# Patient Record
Sex: MALE | Race: OTHER | Employment: OTHER | URBAN - METROPOLITAN AREA
[De-identification: names, ages, dates, MRNs, and addresses within clinical notes are randomized per-mention and may not be internally consistent; named-entity substitution may affect disease eponyms.]

---

## 2017-01-05 ENCOUNTER — HOSPITAL ENCOUNTER (OUTPATIENT)
Dept: LAB | Age: 67
Discharge: HOME | End: 2017-01-05
Payer: MEDICARE

## 2017-01-05 ENCOUNTER — OFFICE VISIT (OUTPATIENT)
Dept: INTERNAL MEDICINE CLINIC | Age: 67
End: 2017-01-05

## 2017-01-05 VITALS
HEART RATE: 81 BPM | TEMPERATURE: 97.8 F | SYSTOLIC BLOOD PRESSURE: 130 MMHG | BODY MASS INDEX: 22.44 KG/M2 | DIASTOLIC BLOOD PRESSURE: 70 MMHG | RESPIRATION RATE: 14 BRPM | WEIGHT: 143 LBS | OXYGEN SATURATION: 98 % | HEIGHT: 67 IN

## 2017-01-05 DIAGNOSIS — R73.01 IFG (IMPAIRED FASTING GLUCOSE): ICD-10-CM

## 2017-01-05 DIAGNOSIS — E78.00 HYPERCHOLESTEROLEMIA: ICD-10-CM

## 2017-01-05 DIAGNOSIS — I10 BENIGN ESSENTIAL HYPERTENSION: Primary | ICD-10-CM

## 2017-01-05 DIAGNOSIS — Z12.5 PROSTATE CANCER SCREENING: ICD-10-CM

## 2017-01-05 DIAGNOSIS — F03.90 DEMENTIA WITHOUT BEHAVIORAL DISTURBANCE, UNSPECIFIED DEMENTIA TYPE: ICD-10-CM

## 2017-01-05 LAB — HBA1C MFR BLD HPLC: 5.6 %

## 2017-01-05 PROCEDURE — 84153 ASSAY OF PSA TOTAL: CPT

## 2017-01-05 PROCEDURE — 83036 HEMOGLOBIN GLYCOSYLATED A1C: CPT

## 2017-01-05 PROCEDURE — 36415 COLL VENOUS BLD VENIPUNCTURE: CPT

## 2017-01-05 PROCEDURE — 80061 LIPID PANEL: CPT

## 2017-01-05 NOTE — PROGRESS NOTES
HPI:  Cristiano Diane is a 78y.o. year old male who returns to clinic today for routine follow up appointment to discuss the issues below:    Here for 6 mo f/u prediabetes, htn and hld. Since last visit got a second opinion in regards to his dementia with Dr. Eryn Correia. She is considering early FTD vs Alzheimer's. He will continue follow up with her again in April. Aricept dose has been increased. He is now fully retired and his wife has taken control of most of the finances. He has been walking regularly for exercise at the park, though there is some dispute about how much he has been walking. His diet isn't quite clear - his wife reports he grazes during the day. He has lost 8 lbs since October and it isn't quite clear why. He reports a good appetite but maybe he isn't fixing robust meals during the day. POC A1c 5.6 % today. He has a possible fam h/o prostate ca and last exam with PSA was in 2012. Prior to Admission medications    Medication Sig Start Date End Date Taking? Authorizing Provider   NORMAinfantis-B.ani-B.long-B.bifi (PROBIOTIC 4X) 10-15 mg TbEC Take  by mouth. Yes Historical Provider   TURMERIC (CURCUMIN) by Does Not Apply route. Yes Historical Provider   VIT C/E/B6/FA/B12/ARGIN/PEP XT (CARDIOTEK, BIOPERINE, PO) Take  by mouth. Yes Historical Provider   magnesium 250 mg tab Take  by mouth daily. Yes Historical Provider   donepezil (ARICEPT) 10 mg tablet Take 1 Tab by mouth daily. 10/13/16  Yes Kaey Salinas MD   atorvastatin (LIPITOR) 20 mg tablet TAKE ONE TABLET BY MOUTH DAILY 7/18/16  Yes Janki Bueno MD   lisinopril (PRINIVIL, ZESTRIL) 10 mg tablet TAKE ONE TABLET BY MOUTH DAILY 7/18/16  Yes Janki Bueno MD   multivitamin (ONE A DAY) tablet Take 1 Tab by mouth daily. Yes Historical Provider   aspirin delayed-release 81 mg tablet Take 81 mg by mouth every three (3) days.    Yes Historical Provider   doxycycline (MONODOX) 100 mg capsule Take 100 mg by mouth every other day. Rosacea   Indications: INHALED ANTHRAX   Yes Historical Provider          No Known Allergies        Review of Systems   Constitutional: Negative for chills, fever and malaise/fatigue. HENT: Negative for congestion. Respiratory: Negative for cough, shortness of breath and wheezing. Cardiovascular: Negative for chest pain, palpitations and leg swelling. Gastrointestinal: Negative for abdominal pain, blood in stool and heartburn. Genitourinary:        He reports: nocturia x 0. He denies: urinary hesitancy, urinary frequency, incomplete voiding, double voiding, weak stream, perineal discomfort. Musculoskeletal: Negative for falls, joint pain and myalgias. Neurological: Negative for dizziness and headaches. Physical Exam   Constitutional: He appears well-nourished. Neck: Carotid bruit is not present. Cardiovascular: Normal rate, regular rhythm and normal heart sounds. No murmur heard. Pulses:       Carotid pulses are 2+ on the right side, and 2+ on the left side. Pulmonary/Chest: Effort normal and breath sounds normal.   Abdominal: Soft. Bowel sounds are normal. There is no hepatosplenomegaly. There is no tenderness. Genitourinary: Rectum normal and prostate normal. Rectal exam shows guaiac negative stool. Prostate is not enlarged and not tender. Genitourinary Comments: Exam chaperoned by Adrian Spencer LPN   Musculoskeletal: He exhibits no edema. Psychiatric: He has a normal mood and affect. His behavior is normal.         Visit Vitals    /70 (BP 1 Location: Left arm, BP Patient Position: Sitting)    Pulse 81    Temp 97.8 °F (36.6 °C) (Oral)    Resp 14    Ht 5' 7\" (1.702 m)    Wt 143 lb (64.9 kg)    SpO2 98%    BMI 22.4 kg/m2         Assessment & Plan:  Jules Goldstein was seen today for hypertension and cholesterol problem.     Diagnoses and all orders for this visit:    Benign essential hypertension  I evaluated and recommended to continue current doses of medications.      Hypercholesterolemia  I evaluated and recommended to continue current doses of medications.      IFG (impaired fasting glucose)  A1c has normalized in the setting of weight loss  -     AMB POC HEMOGLOBIN A1C    Prostate cancer screening  I discussed prostate cancer screening at today's visit including the current dilemma and debate regarding screening and more specifically PSA testing. The pros and cons of proceeding with screening were discussed at length including the risks of unnecessary testing as well as the potential to not detect prostate cancer that may evolve to metastatic disease if decision to not screen is made. The patient concluded to proceed with screening, PSA ordered and DAVE performed  -     PSA SCREENING (SCREENING)    Dementia without behavioral disturbance, unspecified dementia type  Medication management and follow up with Dr. Johny Oscar. I note Fatemeh Moraes continues to lack some insight into his disease. He will need a Pneumovax in May. Follow-up Disposition:  Return in about 6 months (around 7/5/2017) for HTN follow up. Advised him to call back or return to office if symptoms worsen/change/persist.  Discussed expected course/resolution/complications of diagnosis in detail with patient. Medication risks/benefits/costs/interactions/alternatives discussed with patient. He was given an after visit summary which includes diagnoses, current medications, & vitals. He expressed understanding with the diagnosis and plan.

## 2017-01-05 NOTE — PROGRESS NOTES
Reviewed record in preparation for visit and have obtained necessary documentation. Identified pt with two pt identifiers(name and ). Health Maintenance Due   Topic    ZOSTER VACCINE AGE 60>     MEDICARE YEARLY EXAM     FOBT Q 1 YEAR AGE 54-65     INFLUENZA AGE 9 TO ADULT     GLAUCOMA SCREENING Q2Y          Chief Complaint   Patient presents with    Hypertension    Cholesterol Problem        Wt Readings from Last 3 Encounters:   17 143 lb (64.9 kg)   10/13/16 151 lb (68.5 kg)   16 151 lb (68.5 kg)     Temp Readings from Last 3 Encounters:   17 97.8 °F (36.6 °C) (Oral)   16 96.4 °F (35.8 °C) (Oral)   05/04/15 96.7 °F (35.9 °C) (Oral)     BP Readings from Last 3 Encounters:   17 130/70   10/13/16 128/58   16 132/74     Pulse Readings from Last 3 Encounters:   17 81   10/13/16 70   16 (!) 57           Learning Assessment:  :     Learning Assessment 2014   PRIMARY LEARNER Patient   HIGHEST LEVEL OF EDUCATION - PRIMARY LEARNER  > 4 YEARS OF COLLEGE   BARRIERS PRIMARY LEARNER NONE   CO-LEARNER CAREGIVER No   PRIMARY LANGUAGE ENGLISH   LEARNER PREFERENCE PRIMARY OTHER (COMMENT)   ANSWERED BY patient   RELATIONSHIP SELF       Depression Screening:  :     PHQ 2 / 9, over the last two weeks 2016   Little interest or pleasure in doing things Not at all   Feeling down, depressed or hopeless Not at all   Total Score PHQ 2 0       Fall Risk Assessment:  :     Fall Risk Assessment, last 12 mths 2016   Able to walk? Yes   Fall in past 12 months? No       Abuse Screening:  :     Abuse Screening Questionnaire 2014   Do you ever feel afraid of your partner? N   Are you in a relationship with someone who physically or mentally threatens you? N   Is it safe for you to go home?  Y       Coordination of Care Questionnaire:  :     1) Have you been to an emergency room, urgent care clinic since your last visit? no   Hospitalized since your last visit? no 2) Have you seen or consulted any other health care providers outside of [address] [place] [address] since your last visit? no  (Include any pap smears or colon screenings in this section.)    3) Do you have an Advance Directive on file? no    4) Are you interested in receiving information on Advance Directives?  NO

## 2017-01-05 NOTE — MR AVS SNAPSHOT
Visit Information Date & Time Provider Department Dept. Phone Encounter #  
 1/5/2017  2:40 PM MD Keisha Blakely [facility] [phone] Follow-up Instructions Return in about 6 months (around 7/5/2017) for HTN follow up. Your Appointments 4/13/2017  2:40 PM  
Follow Up with Haley Massey MD  
[facility] at [facility]) Appt Note: follow up memory loss cl  
 [address] [facility] [address] [facility] [address] [address]  
[phone]  
  
   
 [address] [address] Upcoming Health Maintenance Date Due ZOSTER VACCINE AGE 60> 1/11/2009 MEDICARE YEARLY EXAM 1/11/2014 FOBT Q 1 YEAR AGE 50-75 11/4/2015 GLAUCOMA SCREENING Q2Y 10/15/2016 DTaP/Tdap/Td series (2 - Td) 1/1/2021 Allergies as of 1/5/2017  Review Complete On: 1/5/2017 By: Jose Cruz Mckeon LPN No Known Allergies Current Immunizations  Reviewed on 1/5/2017 Name Date Influenza Vaccine 10/1/2016, 10/20/2014, 11/5/2013 Pneumococcal Conjugate (PCV-13) 5/20/2016 Pneumococcal Vaccine (Unspecified Type) 10/20/2014 Tdap 1/1/2011 Zoster Vaccine, Live 1/1/2000 Reviewed by [id number], MD on 1/5/2017 at  3:32 PM  
 Reviewed by [id number], MD on 1/5/2017 at  3:32 PM  
 Reviewed by [id number], MD on 1/5/2017 at  3:36 PM  
You Were Diagnosed With   
  
 Codes Comments Benign essential hypertension    -  Primary ICD-10-CM: I10 
ICD-9-CM: 401.1 Hypercholesterolemia     ICD-10-CM: E78.00 ICD-9-CM: 272.0 IFG (impaired fasting glucose)     ICD-10-CM: R73.01 
ICD-9-CM: 790.21 Prostate cancer screening     ICD-10-CM: Z12.5 ICD-9-CM: V76.44 Vitals BP Pulse Temp Resp Height(growth percentile) Weight(growth percentile) 130/70 (BP 1 Location: Left arm, BP Patient Position: Sitting) 81 97.8 °F (36.6 °C) (Oral) 14 5' 7\" (1.702 m) 143 lb (64.9 kg) SpO2 BMI Smoking Status 98% 22.4 kg/m2 Never Smoker BMI and BSA Data Body Mass Index Body Surface Area  
 22.4 kg/m 2 1.75 m 2 Preferred Pharmacy Pharmacy Name Phone Sylvester Riggins [facility], [address] [place] [address] [phone] Your Updated Medication List  
  
   
This list is accurate as of: 1/5/17  3:47 PM.  Always use your most recent med list.  
  
  
  
  
 aspirin delayed-release 81 mg tablet Take 81 mg by mouth every three (3) days. atorvastatin 20 mg tablet Commonly known as:  LIPITOR  
TAKE ONE TABLET BY MOUTH DAILY CARDIOTEK (BIOPERINE) PO Take  by mouth. CURCUMIN  
by Does Not Apply route. donepezil 10 mg tablet Commonly known as:  ARICEPT Take 1 Tab by mouth daily. doxycycline 100 mg capsule Commonly known as:  [place] Take 100 mg by mouth every other day. Rosacea   Indications: INHALED ANTHRAX  
  
 lisinopril 10 mg tablet Commonly known as:  PRINIVIL, ZESTRIL  
TAKE ONE TABLET BY MOUTH DAILY  
  
 magnesium 250 mg Tab Take  by mouth daily. multivitamin tablet Commonly known as:  ONE A DAY Take 1 Tab by mouth daily. PROBIOTIC 4X 10-15 mg Tbec Generic drug:  B.infantis-B.ani-B.long-B.bifi Take  by mouth. We Performed the Following AMB POC HEMOGLOBIN A1C [33083 CPT(R)] PSA SCREENING (SCREENING) [ [facility]] Follow-up Instructions Return in about 6 months (around 7/5/2017) for HTN follow up. Patient Instructions PRESCRIPTION REFILL POLICY Effective 4/2/2014 In an effort to ensure that the large volume of prescription refills are processed in the most efficient and expeditious manner, we are asking our patients to assist us by calling your pharmacy for all prescription refills. This will include Mail Order Pharmacies. The pharmacy will contact our office electronically to continue the refill process. Please do not wait until the last minute to call your pharmacy. We require 72 hours (3 days) to fill prescriptions. We also encourage you to call your pharmacy before picking up your prescription to make sure it is ready. With regard to controlled substance refill request (narcotics and many ADD/ADHD treatment prescriptions) and any other prescriptions that need to be picked up at our office, we ask your cooperation by providing us with at least 72 hours (3 days) notice prior to your need of the prescription. You will need to show a valid ID when picking up your prescription. Anyone delegated by you to  your prescriptions must be listed be listed on you HIPPA authorization form, and show a valid ID. Narcotics will not be refilled on a weekend or on a holiday in which the office is closed. We also encourage an alternative method of refill requests, which is through our medically secure web portal, Tailwind. This is an efficient and effective way to communicate your requests directly with the office and provider. Tailwind can be downloaded onto your smartphone as an Corrie. If you are ready to be connected, please review the attached instructions or speak to any of our staff to get you set up right away! Thank you so much for your cooperation. Should you have any questions, please contact our Practice Administrator, Tomas Rodriguez at [phone] Prostate Cancer Screening: Care Instructions Your Care Instructions The prostate gland is an organ found just below a man's bladder. It is the size and shape of a walnut. It surrounds the tube that carries urine from the bladder out of the body through the penis. This tube is called the urethra. Prostate cancer is the abnormal growth of cells in the prostate.  It is the second most common type of cancer in men. (Skin cancer is the most common.) Most cases of prostate cancer occur in men older than 72. The disease runs in families. And it's more common in -American men. When it's found and treated early, prostate cancer may be cured. But it is not always treated. This is because prostate cancer may not shorten your life, especially if you are older and the cancer is growing slowly. Follow-up care is a key part of your treatment and safety. Be sure to make and go to all appointments, and call your doctor if you are having problems. It's also a good idea to know your test results and keep a list of the medicines you take. What are the screening tests for prostate cancer? The main screening test for prostate cancer is the prostate-specific antigen (PSA) test. This is a blood test that measures how much PSA is in your blood. A high level may mean that you have an enlargement, an infection, or cancer. Along with the PSA test, you may have a digital rectal exam. The digital (finger) rectal exam checks for anything abnormal in your prostate. To do the exam, the doctor puts a lubricated, gloved finger into your rectum. If these tests suggest cancer, you may need a prostate biopsy. How is prostate cancer diagnosed? In a biopsy, the doctor takes small tissue samples from your prostate gland. Another doctor then looks at the tissue under a microscope to see if there are cancer cells, signs of infection, or other problems. The results help diagnose prostate cancer. What are the pros and cons of screening? Neither a PSA test nor a digital rectal exam can tell you for sure that you do or do not have cancer. But they can help you decide if you need more tests, such as a prostate biopsy. Screening tests may be useful because most men with prostate cancer don't have symptoms. It can be hard to know if you have cancer until it is more advanced. And then it's harder to treat. But having a PSA test can also cause harm. The test may show high levels of PSA that aren't caused by cancer. So you could have a prostate biopsy you didn't need. Or the PSA test might be normal when there is cancer, so a cancer might not be found early. The test can also find cancers that would never have caused a problem during your lifetime. So you might have treatment that was not needed. Prostate cancer usually develops late in life and grows slowly. For many men, it does not shorten their lives. Some experts advise screening only for men who are at high risk. Talk with your doctor to see if screening is right for you. Where can you learn more? Go to http://ti-danya.info/. Enter R550 in the search box to learn more about \"Prostate Cancer Screening: Care Instructions. \" Current as of: July 26, 2016 Content Version: 11.1 © 8642-7323 [employer]. Care instructions adapted under license by [employer] (which disclaims liability or warranty for this information). If you have questions about a medical condition or this instruction, always ask your healthcare professional. [address] any warranty or liability for your use of this information. Introducing [facility] & HEALTH SERVICES! Dear Catherine Dumont: Thank you for requesting a Limeade account. Our records indicate that you have previously registered for a Limeade account but its currently inactive. Please call our Limeade support line at [phone]. Additional Information If you have questions, please visit the Frequently Asked Questions section of the Limeade website at [email or web address] Remember, Limeade is NOT to be used for urgent needs. For medical emergencies, dial 911. Now available from your iPhone and Android! Please provide this summary of care documentation to your next provider. Your primary care clinician is listed as Dolores Flanagan. If you have any questions after today's visit, please call [phone].

## 2017-01-05 NOTE — PATIENT INSTRUCTIONS
PRESCRIPTION REFILL POLICY  Effective 0/1/6763    In an effort to ensure that the large volume of prescription refills are processed in the most efficient and expeditious manner, we are asking our patients to assist us by calling your pharmacy for all prescription refills. This will include Mail Order Pharmacies. The pharmacy will contact our office electronically to continue the refill process. Please do not wait until the last minute to call your pharmacy. We require 72 hours (3 days) to fill prescriptions. We also encourage you to call your pharmacy before picking up your prescription to make sure it is ready. With regard to controlled substance refill request (narcotics and many ADD/ADHD treatment prescriptions) and any other prescriptions that need to be picked up at our office, we ask your cooperation by providing us with at least 72 hours (3 days) notice prior to your need of the prescription. You will need to show a valid ID when picking up your prescription. Anyone delegated by you to  your prescriptions must be listed be listed on you HIPPA authorization form, and show a valid ID. Narcotics will not be refilled on a weekend or on a holiday in which the office is closed. We also encourage an alternative method of refill requests, which is through our medically secure web portal, Paperlinks. This is an efficient and effective way to communicate your requests directly with the office and provider. Paperlinks can be downloaded onto your smartphone as an Corrie. If you are ready to be connected, please review the attached instructions or speak to any of our staff to get you set up right away! Thank you so much for your cooperation. Should you have any questions, please contact our Practice Administrator, Winnie Poole at [phone]         Prostate Cancer Screening: Care Instructions  Your Care Instructions    The prostate gland is an organ found just below a man's bladder.  It is the size and shape of a walnut. It surrounds the tube that carries urine from the bladder out of the body through the penis. This tube is called the urethra. Prostate cancer is the abnormal growth of cells in the prostate. It is the second most common type of cancer in men. (Skin cancer is the most common.)  Most cases of prostate cancer occur in men older than 72. The disease runs in families. And it's more common in -American men. When it's found and treated early, prostate cancer may be cured. But it is not always treated. This is because prostate cancer may not shorten your life, especially if you are older and the cancer is growing slowly. Follow-up care is a key part of your treatment and safety. Be sure to make and go to all appointments, and call your doctor if you are having problems. It's also a good idea to know your test results and keep a list of the medicines you take. What are the screening tests for prostate cancer? The main screening test for prostate cancer is the prostate-specific antigen (PSA) test. This is a blood test that measures how much PSA is in your blood. A high level may mean that you have an enlargement, an infection, or cancer. Along with the PSA test, you may have a digital rectal exam. The digital (finger) rectal exam checks for anything abnormal in your prostate. To do the exam, the doctor puts a lubricated, gloved finger into your rectum. If these tests suggest cancer, you may need a prostate biopsy. How is prostate cancer diagnosed? In a biopsy, the doctor takes small tissue samples from your prostate gland. Another doctor then looks at the tissue under a microscope to see if there are cancer cells, signs of infection, or other problems. The results help diagnose prostate cancer. What are the pros and cons of screening? Neither a PSA test nor a digital rectal exam can tell you for sure that you do or do not have cancer.  But they can help you decide if you need more tests, such as a prostate biopsy. Screening tests may be useful because most men with prostate cancer don't have symptoms. It can be hard to know if you have cancer until it is more advanced. And then it's harder to treat. But having a PSA test can also cause harm. The test may show high levels of PSA that aren't caused by cancer. So you could have a prostate biopsy you didn't need. Or the PSA test might be normal when there is cancer, so a cancer might not be found early. The test can also find cancers that would never have caused a problem during your lifetime. So you might have treatment that was not needed. Prostate cancer usually develops late in life and grows slowly. For many men, it does not shorten their lives. Some experts advise screening only for men who are at high risk. Talk with your doctor to see if screening is right for you. Where can you learn more? Go to http://ti-danya.info/. Enter R550 in the search box to learn more about \"Prostate Cancer Screening: Care Instructions. \"  Current as of: July 26, 2016  Content Version: 11.1  © 5469-1829 [employer]. Care instructions adapted under license by [employer] (which disclaims liability or warranty for this information). If you have questions about a medical condition or this instruction, always ask your healthcare professional. [address] any warranty or liability for your use of this information.

## 2017-01-06 LAB — PSA SERPL-MCNC: 1.8 NG/ML (ref 0–4)

## 2017-04-07 NOTE — TELEPHONE ENCOUNTER
Requested Prescriptions     Pending Prescriptions Disp Refills    atorvastatin (LIPITOR) 20 mg tablet 90 Tab 2    lisinopril (PRINIVIL, ZESTRIL) 10 mg tablet 90 Tab 2     Last visit 01/05/2017  Please send to the [facility] pharmacy on file

## 2017-04-10 RX ORDER — ATORVASTATIN CALCIUM 20 MG/1
20 TABLET, FILM COATED ORAL DAILY
Qty: 90 TAB | Refills: 1 | Status: SHIPPED | OUTPATIENT
Start: 2017-04-10 | End: 2017-10-12

## 2017-04-10 RX ORDER — LISINOPRIL 10 MG/1
10 TABLET ORAL DAILY
Qty: 90 TAB | Refills: 1 | Status: SHIPPED | OUTPATIENT
Start: 2017-04-10 | End: 2017-09-30 | Stop reason: SDUPTHER

## 2017-04-13 ENCOUNTER — OFFICE VISIT (OUTPATIENT)
Dept: NEUROLOGY | Age: 67
End: 2017-04-13

## 2017-04-13 VITALS
HEART RATE: 51 BPM | WEIGHT: 149 LBS | DIASTOLIC BLOOD PRESSURE: 68 MMHG | HEIGHT: 67 IN | OXYGEN SATURATION: 98 % | SYSTOLIC BLOOD PRESSURE: 130 MMHG | BODY MASS INDEX: 23.39 KG/M2 | RESPIRATION RATE: 18 BRPM

## 2017-04-13 DIAGNOSIS — F03.90 DEMENTIA WITHOUT BEHAVIORAL DISTURBANCE, UNSPECIFIED DEMENTIA TYPE: Primary | ICD-10-CM

## 2017-04-13 RX ORDER — LANOLIN ALCOHOL/MO/W.PET/CERES
1000 CREAM (GRAM) TOPICAL DAILY
COMMUNITY

## 2017-04-13 NOTE — PATIENT INSTRUCTIONS
Thank you for choosing Alveta Mix and [facility] for your     care. You may receive a survey about your visit. We appreciate you taking time     to complete this survey as we use your feedback to improve our services. We     realize we are not perfect, but we strive to provide excellent care. [facility]   Statement to Patients  April 1, 2014      In an effort to ensure the large volume of patient prescription refills is processed in the most efficient and expeditious manner, we are asking our patients to assist us by calling your Pharmacy for all prescription refills, this will include also your  Mail Order Pharmacy. The pharmacy will contact our office electronically to continue the refill process. Please do not wait until the last minute to call your pharmacy. We need at least 48 hours (2days) to fill prescriptions. We also encourage you to call your pharmacy before going to  your prescription to make sure it is ready. With regard to controlled substance prescription refill requests (narcotic refills) that need to be picked up at our office, we ask your cooperation by providing us with at least 72 hours (3days) notice that you will need a refill. We will not refill narcotic prescription refill requests after 4:00pm on any weekday, Monday through Thursday, or after 2:00pm on Fridays, or on the weekends. We encourage everyone to explore another way of getting your prescription refill request processed using [employer], our patient web portal through our electronic medical record system. [employer] is an efficient and effective way to communicate your medication request directly to the office and  downloadable as an hood on your smart phone . [employer] also features a review functionality that allows you to view your medication list as well as leave messages for your physician. Are you ready to get connected?  If so please review the attatched instructions or speak to any of our staff to get you set up right away! Thank you so much for your cooperation. Should you have any questions please contact our Practice Administrator.     The Physicians and Staff,  [facility]

## 2017-04-13 NOTE — PROGRESS NOTES
Patient here for follow up on memory loss. He stated he feels things have been the same. No falls since last office visit.

## 2017-04-13 NOTE — PROGRESS NOTES
Neurology Consult Note      HISTORY PROVIDED BY: patient    Chief Complaint:   Chief Complaint   Patient presents with    Memory Loss      Subjective:   Pt is a 75 y.o. left handed male initially and last seen in clinic on 10/13/16 in consultation for progressive memory loss, first noticed around 2013, becoming very noticeable in 2015 with Neuropsychological testing by Dr. Mathieu Valadez in March, 2015 with data supporting a memory issue as well as for other cognitive issues including fluency and naming. Insight, judgement and problem solving concerns. Dr. Mathieu Valadez felt that he might have an evolving FTD without behavioral component at that time. MRI brain w/wo contrast 6/19/15 with left temporal atrophy with asymmetric lateral ventricle on the left. His wife denied any significant change in personality, no compulsive behaviors, no disinhibition, no apathy. Exam was non-focal, MMSE score was 24/30 missing two orientation, 3/3 at delayed recall, 1 naming, but had great difficulty with naming, describing items prior to coming up with the name, had great difficulty with attention requiring multiple attempts to spell WORLD backward. History and exam c/w dementia, possibly early FTD vs Alzheimers. Pt did well socially during the history, though was defensive and argumentative at times, without insight into situation, but had clear impairment during the MMSE and his score does not accurately reflect the degree of difficulty he had during the test. Discussed diagnoses, treatment options, and goals of treatment. Recommended increasing Aricept to full dose of 10mg every day. He returns for f/u accompanied by his wife. He feels like he is stable. He is still driving and has not had any problems. Still managing his ADLs without difficulties. His wife is in agreement. She notes that he will often worry about issues that are not issues yet. Still taking Aricept 10mg daily without side effects.      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 Family History   Problem Relation Age of Onset   [facility] Arthritis-osteo Mother     Heart Disease Father 58     acute MI    Heart Disease Paternal Grandfather 62         Objective:   Review of Systems : Per HPI, o/w neg      No Known Allergies     Meds:  Outpatient Medications Prior to Visit   Medication Sig Dispense Refill    atorvastatin (LIPITOR) 20 mg tablet Take 1 Tab by mouth daily. 90 Tab 1    lisinopril (PRINIVIL, ZESTRIL) 10 mg tablet Take 1 Tab by mouth daily. 90 Tab 1    B.infantis-B.ani-B.long-B.bifi (PROBIOTIC 4X) 10-15 mg TbEC Take  by mouth. Indications: takes 1 cap twice a week      TURMERIC (CURCUMIN) by Does Not Apply route.  VIT C/E/B6/FA/B12/ARGIN/PEP XT (CARDIOTEK, BIOPERINE, PO) Take  by mouth.  magnesium 250 mg tab Take  by mouth daily. Indications: takes 3 times a week      donepezil (ARICEPT) 10 mg tablet Take 1 Tab by mouth daily. 90 Tab 3    multivitamin (ONE A DAY) tablet Take 1 Tab by mouth daily.  aspirin delayed-release 81 mg tablet Take 81 mg by mouth every three (3) days.       doxycycline (MONODOX) 100 mg capsule Take 100 mg by mouth every other day. Rosacea   Indications: INHALED ANTHRAX       No facility-administered medications prior to visit. Imaging:  MRI Results (most recent):    Results from Abstract encounter on 11/02/16   MRI BRAIN W WO CONT   CT Results (most recent):  No results found for this or any previous visit. Reviewed records in [employer] tab today    Lab Review   Results for orders placed or performed in visit on 01/05/17   PSA SCREENING (SCREENING)   Result Value Ref Range    Prostate Specific Ag 1.8 0.0 - 4.0 ng/mL   AMB POC HEMOGLOBIN A1C   Result Value Ref Range    Hemoglobin A1c (POC) 5.6 %        Exam:  Visit Vitals    /68    Pulse (!) 51    Resp 18    Ht 5' 7\" (1.702 m)    Wt 67.6 kg (149 lb)    SpO2 98%    BMI 23.34 kg/m2     General:  Alert, cooperative, no distress. Head:  Normocephalic, without obvious abnormality, atraumatic. Respiratory:  Heart:   Non-labored breathing   Neck:      Extremities:    Pulses:        Neurologic:  MS: Alert, speech intact. Language- See MMSE. Attention and fund of knowledge appropriate.   Cranial Nerves:  II: visual fields    II: pupils    II: optic disc    III,VII: ptosis none   III,IV,VI: extraocular muscles  EOMI, no nystagmus    V: facial light touch sensation     VII: facial muscle function   symmetric   VIII: hearing intact   IX: soft palate elevation     XI: trapezius strength     XI: sternocleidomastoid strength    XII: tongue       Motor:   Sensory:   Coordination:   Gait:   Reflexes:     Mini Mental State Exam 4/13/2017 10/13/2016   What is the Year 1 1   What is the Season 1 0   What is the Date 0 0   What is the Day 1 1   What is the Month 1 1   Where are we State 1 1   Where are we Country 1 1   Where are we [address] [place] [address] or [facility] 1 1   Whree are we Floor 1 1   Name three objects, then ask the patient to say them 3 3   Serial sevens Subtract 7 from 100 in increments 4 5   Ask for the three objects repeated above 0 0   Name a pencil 1 1   Name a watch 0 0 Have the patient repeat this phrase \"No ifs, ands, or buts\" 1 1   Three stage command: Take the paper in your right hand 1 1   Fold the paper in half 1 1   Put the paper on the floor 1 1   Read and obey the following: CLOSE YOUR EYES 1 1   Have the patient write a sentence 0 1   Have the patient copy a figure 1 1   Mini Mental Score 23 24          Assessment/Plan   Pt is a 75 y.o. left handed male with dementia, possible FTD vs AD, with progressive memory loss first noticed around 2013, becoming very noticeable in 2015 with Neuropsychological testing by Dr. Annabelle Rodriguez in March, 2015 with data supporting a memory issue as well as for other cognitive issues including fluency and naming. 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 MRI brain w/wo contrast 6/19/15 with left temporal atrophy with asymmetric lateral ventricle on the left. His wife denied any significant change in personality, no compulsive behaviors, no disinhibition, no apathy, but does appear to have increased anxiety at this time. Exam was non-focal, MMSE score at 10/2016 visit 24/30, today 23/30 missing 1 orientation, 1 attn, 3/3 at delayed recall, unable to name 1/2 items describing item in extraordinary detail, unable to write a sentence. dementia, possibly early FTD vs Alzheimers. Continued noted defensiveness and argumentative at times, without insight into situation. Start Namzaric 7mg-10mg and titrate to 28mg-10mg, given titration kit and Rx for full dose. Instructed to stop Aricept. Suggested they call me in 2 months, if doing well on Namzaric at that time, will start Effexor XR 37.5 for mood, appears to have a great deal of anxiety. F/u in clinic in six months, instructed to call in the interim if needed. ICD-10-CM ICD-9-CM    1. Dementia without behavioral disturbance, unspecified dementia type F03.90 294.20        Signed:   Qian Larsen MD  4/13/2017

## 2017-04-13 NOTE — MR AVS SNAPSHOT
Visit Information Date & Time Provider Department Dept. Phone Encounter #  
 4/13/2017  2:40 PM Alejandro Adames MD [facility] at [facility] [id number] Follow-up Instructions Return in about 6 months (around 10/13/2017). Routing History Your Appointments 7/11/2017  2:20 PM  
ROUTINE CARE with MD Keisha Bojorquez [facility] ([facility]) Appt Note: 6 mo f/u for HTN  
 [address], [address]  
[address], [phone] Upcoming Health Maintenance Date Due  
 MEDICARE YEARLY EXAM 1/11/2014 FOBT Q 1 YEAR AGE 50-75 11/4/2015 GLAUCOMA SCREENING Q2Y 10/15/2016 Pneumococcal 65+ Low/Medium Risk (2 of 2 - PPSV23) 5/20/2017 DTaP/Tdap/Td series (2 - Td) 1/1/2021 Allergies as of 4/13/2017  Review Complete On: 4/13/2017 By: Oly Lira LPN No Known Allergies Current Immunizations  Reviewed on 1/5/2017 Name Date Influenza Vaccine 10/1/2016, 10/20/2014, 11/5/2013 Pneumococcal Conjugate (PCV-13) 5/20/2016 Tdap 1/1/2011 Zoster Vaccine, Live 1/1/2000 Not reviewed this visit Vitals BP Pulse Resp Height(growth percentile) Weight(growth percentile) SpO2  
 130/68 (!) 51 18 5' 7\" (1.702 m) 149 lb (67.6 kg) 98% BMI Smoking Status 23.34 kg/m2 Never Smoker Vitals History BMI and BSA Data Body Mass Index Body Surface Area  
 23.34 kg/m 2 1.79 m 2 Preferred Pharmacy Pharmacy Name Phone [facility] [phone] [facility] [phone] Your Updated Medication List  
  
   
This list is accurate as of: 4/13/17  3:21 PM.  Always use your most recent med list.  
  
  
  
  
 aspirin delayed-release 81 mg tablet Take 81 mg by mouth every three (3) days. atorvastatin 20 mg tablet Commonly known as:  LIPITOR Take 1 Tab by mouth daily. CARDIOTEK (BIOPERINE) PO Take  by mouth. COCONUT OIL PO Take  by mouth. CURCUMIN  
by Does Not Apply route. doxycycline 100 mg capsule Commonly known as:  Carlos Baker Take 100 mg by mouth every other day. Rosacea   Indications: INHALED ANTHRAX  
  
 lisinopril 10 mg tablet Commonly known as:  Sundarne Robel Take 1 Tab by mouth daily. magnesium 250 mg Tab Take  by mouth daily. Indications: takes 3 times a week  
  
 memantine-donepezil 28-10 mg Cspx Commonly known as:  MOT[employer] PI[employer]URE [facility] Take 1 Cap by mouth daily. multivitamin tablet Commonly known as:  ONE A DAY Take 1 Tab by mouth daily. PROBIOTIC 4X 10-15 mg Tbec Generic drug:  B.infantis-B.ani-B.long-B.bifi Take  by mouth. Indications: takes 1 cap twice a week VITAMIN B-12 1,000 mcg tablet Generic drug:  cyanocobalamin Take 1,000 mcg by mouth daily. Indications: takes 3 days a week Prescriptions Printed Refills  
 memantine-donepezil (NAMZARIC) 28-10 mg CSpX 3 Sig: Take 1 Cap by mouth daily. Class: Print Route: Oral  
  
Follow-up Instructions Return in about 6 months (around 10/13/2017). Patient Instructions Thank you for choosing [facility] for your  
 
care. You may receive a survey about your visit. We appreciate you taking time  
 
to complete this survey as we use your feedback to improve our services. We  
 
realize we are not perfect, but we strive to provide excellent care. PRESCRIPTION REFILL POLICY [place] [employer] [facility] Statement to Patients April 1, 2014 In an effort to ensure the large volume of patient prescription refills is processed in the most efficient and expeditious manner, we are asking our patients to assist us by calling your Pharmacy for all prescription refills, this will include also your  Mail Order Pharmacy.  The pharmacy will contact our office electronically to continue the refill process. Please do not wait until the last minute to call your pharmacy. We need at least 48 hours (2days) to fill prescriptions. We also encourage you to call your pharmacy before going to  your prescription to make sure it is ready. With regard to controlled substance prescription refill requests (narcotic refills) that need to be picked up at our office, we ask your cooperation by providing us with at least 72 hours (3days) notice that you will need a refill. We will not refill narcotic prescription refill requests after 4:00pm on any weekday, Monday through Thursday, or after 2:00pm on Fridays, or on the weekends. We encourage everyone to explore another way of getting your prescription refill request processed using [employer], our patient web portal through our electronic medical record system. [employer] is an efficient and effective way to communicate your medication request directly to the office and  downloadable as an hood on your smart phone . [employer] also features a review functionality that allows you to view your medication list as well as leave messages for your physician. Are you ready to get connected? If so please review the attatched instructions or speak to any of our staff to get you set up right away! Thank you so much for your cooperation. Should you have any questions please contact our Practice Administrator. The Physicians and Staff,  [facility] SERVICES! Dear Johnathon Al: Thank you for requesting a [employer] account. Our records indicate that you have previously registered for a [employer] account but its currently inactive. Please call our [employer] support line at [phone]. Additional Information If you have questions, please visit the Frequently Asked Questions section of the [employer] website at [email or web address] Remember, MyChart is NOT to be used for urgent needs. For medical emergencies, dial 911. Now available from your iPhone and Android! Please provide this summary of care documentation to your next provider. Your primary care clinician is listed as [address]. If you have any questions after today's visit, please call [phone].

## 2017-05-09 ENCOUNTER — TELEPHONE (OUTPATIENT)
Dept: NEUROLOGY | Age: 67
End: 2017-05-09

## 2017-05-09 NOTE — TELEPHONE ENCOUNTER
----- Message from Harish Barros sent at 5/9/2017  8:35 AM EDT -----  Regarding: FW: Ge/telephone      ----- Message -----     From: Mireya Chirinos     Sent: 5/8/2017   2:57 PM       To: [facility] Office  Subject: [place]/telephone                                 Pts wife Delorise Letters stated the medication Namzaric is to costly and she is requesting to know if there is another medication. Ms Anthony Madsen phone number is cell [phone] and Ray.

## 2017-05-11 ENCOUNTER — TELEPHONE (OUTPATIENT)
Dept: NEUROLOGY | Age: 67
End: 2017-05-11

## 2017-05-30 ENCOUNTER — TELEPHONE (OUTPATIENT)
Dept: NEUROLOGY | Age: 67
End: 2017-05-30

## 2017-05-30 NOTE — TELEPHONE ENCOUNTER
Spoke with patient's wife. She stated she called on Thursday, 5/25/17, and requested a letter to be typed up stating that her \" has Alzheimer's and can't make financial decisions\". She stated that she requested this letter to be ready on Friday, 5/26/17, as she has an appointment today at noon with her . She called today requested update on this letter. Writer informed her that a phone call message was not placed on 5/25/17 and apologized for the miscommunication and inconvenience but can assist her with this request. She asked to be notified if the letter can be ready this morning.

## 2017-05-30 NOTE — TELEPHONE ENCOUNTER
Pt's wife calling in re to a letter that needs to be written.  Please give her a call back, she says it is urgent

## 2017-05-30 NOTE — TELEPHONE ENCOUNTER
Spoke with patient's wife. She would like to come  the letter instead since she has rescheduled her appointment with her  for tomorrow.

## 2017-05-30 NOTE — TELEPHONE ENCOUNTER
I am happy to provide a letter. Ready for . Sorry, but I did not receive a message regarding this letter on Thursday.

## 2017-06-22 ENCOUNTER — TELEPHONE (OUTPATIENT)
Dept: NEUROLOGY | Age: 67
End: 2017-06-22

## 2017-06-22 NOTE — TELEPHONE ENCOUNTER
Spoke with patient's wife. Informed her that Dr. Josefa Curry was out of the office at the beginning of this week and returned yesterday. Informed her she did received the letter. She stated that she has an appointment tomorrow morning at the social security office and needs the letter for that appointment. Writer told Marlena Tadeo Castaneda that I am not sure the letter would be ready by 2:10pm today as Dr. Josefa Curry is seeing patients all day but will forward this message to her and will call back with any updates.

## 2017-06-22 NOTE — TELEPHONE ENCOUNTER
Pt's wife calling to check on the urgent Dr. Aayush Villegas was supposed to write.  She would like to pick it up today at 2:10pm. Please give her a call back as soon as possible

## 2017-06-23 NOTE — TELEPHONE ENCOUNTER
Left message for patient's wife to call the office back. Letter is completed and ready to be picked up.

## 2017-06-23 NOTE — TELEPHONE ENCOUNTER
Spoke with patient's wife and informed her that the letter is ready to be picked up at the . Mrs. Marialuisa Kumar was given an opportunity to ask questions, repeated information, and verbalized understanding.

## 2017-08-07 ENCOUNTER — OFFICE VISIT (OUTPATIENT)
Dept: INTERNAL MEDICINE CLINIC | Age: 67
End: 2017-08-07

## 2017-08-07 VITALS
OXYGEN SATURATION: 98 % | HEIGHT: 65 IN | BODY MASS INDEX: 24.99 KG/M2 | TEMPERATURE: 96.9 F | DIASTOLIC BLOOD PRESSURE: 80 MMHG | RESPIRATION RATE: 18 BRPM | WEIGHT: 150 LBS | SYSTOLIC BLOOD PRESSURE: 130 MMHG | HEART RATE: 59 BPM

## 2017-08-07 DIAGNOSIS — Z23 ENCOUNTER FOR IMMUNIZATION: ICD-10-CM

## 2017-08-07 DIAGNOSIS — R41.3 MEMORY LOSS: ICD-10-CM

## 2017-08-07 DIAGNOSIS — R73.09 IMPAIRED GLUCOSE METABOLISM: ICD-10-CM

## 2017-08-07 DIAGNOSIS — E78.00 HYPERCHOLESTEROLEMIA: Primary | ICD-10-CM

## 2017-08-07 DIAGNOSIS — I10 BENIGN ESSENTIAL HYPERTENSION: ICD-10-CM

## 2017-08-07 NOTE — PROGRESS NOTES
Establish Care and Annual Wellness Visit       HPI:  Juana Vargas is a 75y.o. year old male who is here to establish [facility]. He  had his medical care:    [facility]    He reports the following history and medical concerns:      Former   Memory issues- SDAT- June 2015. On aricept and Namenda. Dr. Deidre Worley. High cholesterol- lipitor 20 mg    HTN- lisinopril 10 mg    Baby aspirin 81 mg every 3 days. Exercises regularly per patient. - different routes. Rosacea- doxycycline. Magnesium TID- nerves. Assessment and Plan        1. Hypercholesterolemia  Discussion about possible side effects of lipitor on memory. Does have FH but they both smoked heavily and father was not compliant with medications. Both wife and patient are in agreement to stop lipitor, recheck in 8 weeks, and if very high, consider a water soluble statin. - LIPID PANEL; Future  - CBC WITH AUTOMATED DIFF; Future  - TSH REFLEX TO T4; Future  - METABOLIC PANEL, COMPREHENSIVE; Future  - HEMOGLOBIN A1C WITH EAG; Future    2. Benign essential hypertension  Tolerating medication. Denies dizziness that is positional, SOB, or chest pain. Understands the importance of compliance to reduce risk of future heart failure. Agreed to call if any of above symptoms develop and  stay on current regimen of  Lisinopril. 3. Memory loss  On aricept and namenda. Discussed exercise daily, new stimulation. 4. Impaired glucose metabolism  Watch for high carb meals. 5. Encounter for immunization  Immunization given. Discussed risks and benefits.   Side effects.   - Pneumococcal polysaccharide vaccine, 23-valent, adult or immunosuppressed pt dose          Visit Vitals    /80 (BP 1 Location: Left arm, BP Patient Position: Sitting)    Pulse (!) 59    Temp 96.9 °F (36.1 °C) (Oral)    Resp 18    Ht 5' 4.5\" (1.638 m)    Wt 150 lb (68 kg)    SpO2 98%    BMI 25.35 kg/m2       Historical Data    Past Medical History:   Diagnosis Date  Altered glucose metabolism     Hypercholesterolemia     Hypertension     Rosacea        Past Surgical History:   Procedure Laterality Date    CHEST SURGERY PROCEDURE UNLISTED      rib fracturewith hematoma    HX APPENDECTOMY      HX COLONOSCOPY  2013    10 years    HX ORTHOPAEDIC      heel injury requiring skin graft    HX TONSILLECTOMY         Outpatient Encounter Prescriptions as of 8/7/2017   Medication Sig Dispense Refill    cyanocobalamin (VITAMIN B-12) 1,000 mcg tablet Take 1,000 mcg by mouth daily. Indications: takes 3 days a week      memantine-donepezil [facility]) 28-10 mg CSpX Take 1 Cap by mouth daily. 90 Cap 3    atorvastatin (LIPITOR) 20 mg tablet Take 1 Tab by mouth daily. 90 Tab 1    lisinopril (PRINIVIL, ZESTRIL) 10 mg tablet Take 1 Tab by mouth daily. 90 Tab 1    TURMERIC (CURCUMIN) by Does Not Apply route.  VIT C/E/B6/FA/B12/ARGIN/PEP XT (CARDIOTEK, BIOPERINE, PO) Take  by mouth.  magnesium 250 mg tab Take  by mouth daily. Indications: takes 3 times a week      multivitamin (ONE A DAY) tablet Take 1 Tab by mouth daily.  aspirin delayed-release 81 mg tablet Take 81 mg by mouth every three (3) days.  doxycycline (MONODOX) 100 mg capsule Take 100 mg by mouth every other day. Rosacea   Indications: INHALED ANTHRAX      memantine-donepezil [facility]) 7/14/21/28 mg-10 mg C24k Take 7 mg by mouth daily. 28 Cap 0    COCONUT OIL PO Take  by mouth.  B.infantis-B.ani-B.long-B.bifi (PROBIOTIC 4X) 10-15 mg TbEC Take  by mouth. Indications: takes 1 cap twice a week       No facility-administered encounter medications on file as of 8/7/2017.          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 family history includes Arthritis-osteo in his mother; Heart Disease (age of onset: 62) in his paternal grandfather; Heart Disease (age of onset: 58) in his father. Review of Systems   Constitutional: Negative for weight loss. Eyes: Negative for blurred vision. Respiratory: Negative for shortness of breath. Cardiovascular: Negative for chest pain. Gastrointestinal: Negative for abdominal pain. Genitourinary: Negative for dysuria and frequency. Musculoskeletal: Negative for myalgias. Skin: Negative for rash. Neurological: Negative for dizziness, focal weakness, weakness and headaches. Endo/Heme/Allergies: Negative for environmental allergies. Does not bruise/bleed easily. Psychiatric/Behavioral: Positive for memory loss. Negative for depression. The patient is not nervous/anxious. Physical Exam   Constitutional: He appears well-developed and well-nourished. He is active. Non-toxic appearance. He does not have a sickly appearance. He does not appear ill. No distress. Eyes: Conjunctivae are normal. Right eye exhibits no discharge. Cardiovascular: Normal rate, regular rhythm, S1 normal, S2 normal, normal heart sounds and normal pulses. Exam reveals no gallop and no friction rub. Pulmonary/Chest: Effort normal and breath sounds normal. No respiratory distress. Abdominal: Soft. Bowel sounds are normal.   Musculoskeletal: He exhibits no edema or deformity. Neurological: He is alert. Skin: Skin is warm and dry. No rash noted. No pallor. Psychiatric: He has a normal mood and affect. His behavior is normal. His mood appears not anxious. His affect is not angry, not blunt, not labile and not inappropriate. His speech is not delayed, not tangential and not slurred.  He is not agitated, not hyperactive, not slowed, not withdrawn and not combative. Thought content is not paranoid. Cognition and memory are impaired. He does not express impulsivity. He does not exhibit a depressed mood. He expresses no suicidal ideation. He is communicative. He exhibits abnormal recent memory. He is attentive. Vitals reviewed. Ortho Exam       No orders of the defined types were placed in this encounter. I have reviewed the patient's medical history in detail and updated the computerized patient record. We had a prolonged discussion about these complex clinical issues and went over the various important aspects to consider. All questions were answered. Advised him to call back or return to office if symptoms do not improve, change in nature, or persist.    He was given an after visit summary or informed of [employer] Access which includes patient instructions, diagnoses, current medications, & vitals. He expressed understanding with the diagnosis and plan. Suzy Ramirez. is a 75 y.o. male and presents for annual Medicare Wellness Visit. Assessment of cognitive impairment: Alert and oriented x 3 but with memory deficits. Patient reports cognitive deficits. Laments his unable to drive. Problem List: Reviewed with patient and discussed risk factors. Patient Active Problem List   Diagnosis Code    Benign essential hypertension I10    Hypercholesterolemia E78.00    Pre-diabetes R73.03    H/O colonoscopy Z98.890    Memory loss R41.3    Dementia without behavioral disturbance F03.90       Current medical providers:  Patient Care Team:  Penny Angulo MD as PCP - General (Internal Medicine)        End of Life Planning: This was discussed with him today and he has an advanced directive - a copy HAS NOT been provided. Reviewed DNR/DNI and patient is no but will think seriously about it with wife. Forms given. Depression Screen: Reviewed PQH2 done by nurse.   PHQ over the last two weeks 8/7/2017   Little interest or pleasure in doing things Not at all   Feeling down, depressed or hopeless Not at all   Total Score PHQ 2 0       Fall Risk:   Fall Risk Assessment, last 12 mths 8/7/2017   Able to walk? Yes   Fall in past 12 months? No       Home Safety - discussion completed. No issues found. Hearing Loss:  denies any hearing loss    Activities of Daily Living:  Partial assistance. Requires assistance with: no ADLs    Adult Nutrition Screen:  No risk factors noted. Health Maintenance- Reviewed    AAA Screening:  Glaucoma Screening:       Health Maintenance Due   Topic Date Due    MEDICARE YEARLY EXAM  01/11/2014    FOBT Q 1 YEAR AGE 50-75  11/04/2015    GLAUCOMA SCREENING Q2Y  10/15/2016    Pneumococcal 65+ Low/Medium Risk (2 of 2 - PPSV23) 05/20/2017    INFLUENZA AGE 9 TO ADULT  08/01/2017        Health Maintenance reviewed -  Plan:      Orders Placed This Encounter    Pneumococcal polysaccharide vaccine, 23-valent, adult or immunosuppressed pt dose    LIPID PANEL    CBC WITH AUTOMATED DIFF    TSH REFLEX TO T4    METABOLIC PANEL, COMPREHENSIVE    HEMOGLOBIN A1C WITH EAG    pneumococcal 23-valent (PNEUMOVAX 23) 25 mcg/0.5 mL injection           Plan:    Diagnoses and all orders for this visit:    1. Hypercholesterolemia  -     LIPID PANEL; Future  -     CBC WITH AUTOMATED DIFF; Future  -     TSH REFLEX TO T4; Future  -     METABOLIC PANEL, COMPREHENSIVE; Future  -     HEMOGLOBIN A1C WITH EAG; Future    2. Benign essential hypertension    3. Memory loss    4. Impaired glucose metabolism    5. Encounter for immunization  -     Pneumococcal polysaccharide vaccine, 23-valent, adult or immunosuppressed pt dose    Other orders  -     pneumococcal 23-valent (PNEUMOVAX 23) 25 mcg/0.5 mL injection; 0.5 mL by IntraMUSCular route once for 1 dose.       Orders Placed This Encounter    Pneumococcal polysaccharide vaccine, 23-valent, adult or immunosuppressed pt dose    LIPID PANEL    CBC WITH AUTOMATED DIFF    TSH REFLEX TO T4    METABOLIC PANEL, COMPREHENSIVE    HEMOGLOBIN A1C WITH EAG    pneumococcal 23-valent (PNEUMOVAX 23) 25 mcg/0.5 mL injection         Follow-up Disposition:  Return in about 6 months (around 2/7/2018) for Follow up. Reviewed with patient the treatment plan, goals of treatment plan, and limitations of treatment plan, to include the potential for side effects from medications and procedures. If side effects occur, it is the responsibility of the patient to inform the clinic so that a change in the treatment plan can be made in a safe manner. The patient is advised that stopping prescribed medication may cause an increase in symptoms and possible medication withdrawal symptoms. The patient is informed an emergency room evaluation may be necessary if this occurs. Patient verbalized understanding and is in agreement with treatment plan as outlined above. All questions answered.

## 2017-08-07 NOTE — PROGRESS NOTES
Chief Complaint   Patient presents with   [facility] Annual Wellness Visit        1. Have you been to the ER, urgent care clinic since your last visit? No  Hospitalized since your last visit? No    2. Have you seen or consulted any other health care providers outside of the [facility] since your last visit? No  Include any pap smears or colon screening.  No

## 2017-08-07 NOTE — MR AVS SNAPSHOT
Visit Information Date & Time Provider Department Dept. Phone Encounter #  
 8/7/2017  2:15 PM Greyson Smith MD [place] [id number] [phone] Follow-up Instructions Return in about 6 months (around 2/7/2018) for Follow up. Your Appointments 10/19/2017  3:20 PM  
Follow Up with MD Ai Freedman [facility] Appt Note: 6 month f/u NN 4/13/17 [address] [place] [address]  
[phone]  
  
   
 [address] [place] [id number] Upcoming Health Maintenance Date Due  
 MEDICARE YEARLY EXAM 1/11/2014 FOBT Q 1 YEAR AGE 50-75 11/4/2015 GLAUCOMA SCREENING Q2Y 10/15/2016 Pneumococcal 65+ Low/Medium Risk (2 of 2 - PPSV23) 5/20/2017 INFLUENZA AGE 9 TO ADULT 8/1/2017 DTaP/Tdap/Td series (2 - Td) 1/1/2021 Allergies as of 8/7/2017  Review Complete On: 8/7/2017 By: Greyson Smith MD  
 No Known Allergies Current Immunizations  Reviewed on 5/4/2017 Name Date Influenza Vaccine 10/1/2016, 10/20/2014, 11/5/2013 Pneumococcal Conjugate (PCV-13) 5/20/2016 Tdap 1/1/2011 Zoster Vaccine, Live 1/1/2000 Not reviewed this visit You Were Diagnosed With   
  
 Codes Comments Hypercholesterolemia    -  Primary ICD-10-CM: E78.00 ICD-9-CM: 272.0 Benign essential hypertension     ICD-10-CM: I10 
ICD-9-CM: 401.1 Memory loss     ICD-10-CM: R41.3 ICD-9-CM: 780.93 Impaired glucose metabolism     ICD-10-CM: R73.02 
ICD-9-CM: 790.22 Vitals BP Pulse Temp Resp Height(growth percentile) Weight(growth percentile) 130/80 (BP 1 Location: Left arm, BP Patient Position: Sitting) (!) 59 96.9 °F (36.1 °C) (Oral) 18 5' 4.5\" (1.638 m) 150 lb (68 kg) SpO2 BMI Smoking Status 98% 25.35 kg/m2 Never Smoker Vitals History BMI and BSA Data Body Mass Index Body Surface Area 25.35 kg/m 2 1.76 m 2 Preferred Pharmacy Pharmacy Name Phone [phone] [phone] [facility] [phone] Your Updated Medication List  
  
   
This list is accurate as of: 17  3:19 PM.  Always use your most recent med list.  
  
  
  
  
 aspirin delayed-release 81 mg tablet Take 81 mg by mouth every three (3) days. atorvastatin 20 mg tablet Commonly known as:  LIPITOR Take 1 Tab by mouth daily. CARDIOTEK (BIOPERINE) PO Take  by mouth. CURCUMIN  
by Does Not Apply route. doxycycline 100 mg capsule Commonly known as:  [place] Take 100 mg by mouth every other day. Rosacea   Indications: INHALED ANTHRAX  
  
 lisinopril 10 mg tablet Commonly known as:  [place] Take 1 Tab by mouth daily. magnesium 250 mg Tab Take  by mouth daily. Indications: takes 3 times a week  
  
 memantine-donepezil 28-10 mg Cspx Commonly known as:  [facility] Take 1 Cap by mouth daily. multivitamin tablet Commonly known as:  ONE A DAY Take 1 Tab by mouth daily. pneumococcal 23-valent 25 mcg/0.5 mL injection Commonly known as:  PNEUMOVAX 23  
0.5 mL by IntraMUSCular route once for 1 dose. VITAMIN B-12 1,000 mcg tablet Generic drug:  cyanocobalamin Take 1,000 mcg by mouth daily. Indications: takes 3 days a week Prescriptions Sent to Pharmacy Refills  
 pneumococcal 23-valent (PNEUMOVAX 23) 25 mcg/0.5 mL injection 0 Si.5 mL by IntraMUSCular route once for 1 dose. Class: Normal  
 Pharmacy: [address] [place] [address] #: [phone] Route: IntraMUSCular Follow-up Instructions Return in about 6 months (around 2018) for Follow up. To-Do List   
 2017 Lab:  CBC WITH AUTOMATED DIFF   
  
 2017 Lab:  HEMOGLOBIN A1C WITH EAG   
  
 2017 Lab:  LIPID PANEL   
  
 2017 Lab: METABOLIC PANEL, COMPREHENSIVE   
  
 09/29/2017 Lab:  TSH REFLEX TO T4 Patient Instructions Stop atorvastatin and come back in 8 weeks for labs Introducing [facility] SERVICES! Dear Fanta Huerta: Thank you for requesting a [employer] account. Our records indicate that you have previously registered for a [employer] account but its currently inactive. Please call our [employer] support line at [phone]. Additional Information If you have questions, please visit the Frequently Asked Questions section of the [employer] website at [email or web address] Remember, [employer] is NOT to be used for urgent needs. For medical emergencies, dial 911. Now available from your iPhone and Android! Please provide this summary of care documentation to your next provider. Your primary care clinician is listed as Freya Kimbrough. If you have any questions after today's visit, please call [phone].

## 2017-10-05 ENCOUNTER — HOSPITAL ENCOUNTER (OUTPATIENT)
Dept: LAB | Age: 67
Discharge: HOME | End: 2017-10-05
Payer: MEDICARE

## 2017-10-05 PROCEDURE — 83036 HEMOGLOBIN GLYCOSYLATED A1C: CPT

## 2017-10-05 PROCEDURE — 84443 ASSAY THYROID STIM HORMONE: CPT

## 2017-10-05 PROCEDURE — 85025 COMPLETE CBC W/AUTO DIFF WBC: CPT

## 2017-10-05 PROCEDURE — 80061 LIPID PANEL: CPT

## 2017-10-05 PROCEDURE — 80053 COMPREHEN METABOLIC PANEL: CPT

## 2017-10-05 PROCEDURE — 36415 COLL VENOUS BLD VENIPUNCTURE: CPT

## 2017-10-06 LAB
ALBUMIN SERPL-MCNC: 4.4 G/DL (ref 3.6–4.8)
ALBUMIN/GLOB SERPL: 1.8 {RATIO} (ref 1.2–2.2)
ALP SERPL-CCNC: 67 IU/L (ref 39–117)
ALT SERPL-CCNC: 18 IU/L (ref 0–44)
AST SERPL-CCNC: 26 IU/L (ref 0–40)
BASOPHILS # BLD AUTO: 0 X10E3/UL (ref 0–0.2)
BASOPHILS NFR BLD AUTO: 1 %
BILIRUB SERPL-MCNC: 0.5 MG/DL (ref 0–1.2)
BUN SERPL-MCNC: 17 MG/DL (ref 8–27)
BUN/CREAT SERPL: 22 (ref 10–24)
CALCIUM SERPL-MCNC: 9.6 MG/DL (ref 8.6–10.2)
CHLORIDE SERPL-SCNC: 101 MMOL/L (ref 96–106)
CHOLEST SERPL-MCNC: 265 MG/DL (ref 100–199)
CO2 SERPL-SCNC: 26 MMOL/L (ref 18–29)
CREAT SERPL-MCNC: 0.79 MG/DL (ref 0.76–1.27)
EOSINOPHIL # BLD AUTO: 0.2 X10E3/UL (ref 0–0.4)
EOSINOPHIL NFR BLD AUTO: 2 %
ERYTHROCYTE [DISTWIDTH] IN BLOOD BY AUTOMATED COUNT: 13.4 % (ref 12.3–15.4)
EST. AVERAGE GLUCOSE BLD GHB EST-MCNC: 126 MG/DL
GLOBULIN SER CALC-MCNC: 2.4 G/DL (ref 1.5–4.5)
GLUCOSE SERPL-MCNC: 98 MG/DL (ref 65–99)
HBA1C MFR BLD: 6 % (ref 4.8–5.6)
HCT VFR BLD AUTO: 44.7 % (ref 37.5–51)
HDLC SERPL-MCNC: 65 MG/DL
HGB BLD-MCNC: 15.1 G/DL (ref 12.6–17.7)
IMM GRANULOCYTES # BLD: 0 X10E3/UL (ref 0–0.1)
IMM GRANULOCYTES NFR BLD: 0 %
INTERPRETATION, 910389: NORMAL
LDLC SERPL CALC-MCNC: 173 MG/DL (ref 0–99)
LYMPHOCYTES # BLD AUTO: 1.9 X10E3/UL (ref 0.7–3.1)
LYMPHOCYTES NFR BLD AUTO: 24 %
MCH RBC QN AUTO: 30.9 PG (ref 26.6–33)
MCHC RBC AUTO-ENTMCNC: 33.8 G/DL (ref 31.5–35.7)
MCV RBC AUTO: 91 FL (ref 79–97)
MONOCYTES # BLD AUTO: 0.4 X10E3/UL (ref 0.1–0.9)
MONOCYTES NFR BLD AUTO: 6 %
NEUTROPHILS # BLD AUTO: 5.2 X10E3/UL (ref 1.4–7)
NEUTROPHILS NFR BLD AUTO: 67 %
PLATELET # BLD AUTO: 299 X10E3/UL (ref 150–379)
POTASSIUM SERPL-SCNC: 5.4 MMOL/L (ref 3.5–5.2)
PROT SERPL-MCNC: 6.8 G/DL (ref 6–8.5)
RBC # BLD AUTO: 4.89 X10E6/UL (ref 4.14–5.8)
SODIUM SERPL-SCNC: 144 MMOL/L (ref 134–144)
TRIGL SERPL-MCNC: 134 MG/DL (ref 0–149)
TSH SERPL DL<=0.005 MIU/L-ACNC: 2.17 UIU/ML (ref 0.45–4.5)
VLDLC SERPL CALC-MCNC: 27 MG/DL (ref 5–40)
WBC # BLD AUTO: 7.7 X10E3/UL (ref 3.4–10.8)

## 2017-10-12 ENCOUNTER — TELEPHONE (OUTPATIENT)
Dept: INTERNAL MEDICINE CLINIC | Age: 67
End: 2017-10-12

## 2017-10-12 DIAGNOSIS — E78.00 HYPERCHOLESTEROLEMIA: Primary | ICD-10-CM

## 2017-10-12 RX ORDER — PRAVASTATIN SODIUM 40 MG/1
40 TABLET ORAL
Qty: 90 TAB | Refills: 3 | Status: SHIPPED | OUTPATIENT
Start: 2017-10-12 | End: 2018-09-30 | Stop reason: SDUPTHER

## 2017-10-12 NOTE — TELEPHONE ENCOUNTER
Discussed with wife. Worried about lipitor and memory  Off lipitor his chol went very high.   Will change to water soluble statin like pravachol 40 mg.  Recheck in 2 months labs

## 2017-10-19 ENCOUNTER — OFFICE VISIT (OUTPATIENT)
Dept: NEUROLOGY | Age: 67
End: 2017-10-19

## 2017-10-19 VITALS
RESPIRATION RATE: 18 BRPM | WEIGHT: 149.6 LBS | HEIGHT: 65 IN | BODY MASS INDEX: 24.93 KG/M2 | SYSTOLIC BLOOD PRESSURE: 122 MMHG | OXYGEN SATURATION: 98 % | DIASTOLIC BLOOD PRESSURE: 62 MMHG | HEART RATE: 60 BPM

## 2017-10-19 DIAGNOSIS — F03.90 DEMENTIA WITHOUT BEHAVIORAL DISTURBANCE, UNSPECIFIED DEMENTIA TYPE: Primary | ICD-10-CM

## 2017-10-19 DIAGNOSIS — R45.4 IRRITABILITY AND ANGER: ICD-10-CM

## 2017-10-19 RX ORDER — VITAMIN E 268 MG
400 CAPSULE ORAL DAILY
COMMUNITY

## 2017-10-19 RX ORDER — VENLAFAXINE HYDROCHLORIDE 37.5 MG/1
37.5 CAPSULE, EXTENDED RELEASE ORAL DAILY
Qty: 90 CAP | Refills: 3 | Status: SHIPPED | OUTPATIENT
Start: 2017-10-19 | End: 2018-11-01 | Stop reason: SDUPTHER

## 2017-10-19 NOTE — PATIENT INSTRUCTIONS
[facility]   Statement to Patients  April 1, 2014      In an effort to ensure the large volume of patient prescription refills is processed in the most efficient and expeditious manner, we are asking our patients to assist us by calling your Pharmacy for all prescription refills, this will include also your  Mail Order Pharmacy. The pharmacy will contact our office electronically to continue the refill process. Please do not wait until the last minute to call your pharmacy. We need at least 48 hours (2days) to fill prescriptions. We also encourage you to call your pharmacy before going to  your prescription to make sure it is ready. With regard to controlled substance prescription refill requests (narcotic refills) that need to be picked up at our office, we ask your cooperation by providing us with at least 72 hours (3days) notice that you will need a refill. We will not refill narcotic prescription refill requests after 4:00pm on any weekday, Monday through Thursday, or after 2:00pm on Fridays, or on the weekends. We encourage everyone to explore another way of getting your prescription refill request processed using [employer], our patient web portal through our electronic medical record system. [employer] is an efficient and effective way to communicate your medication request directly to the office and  downloadable as an hood on your smart phone . [employer] also features a review functionality that allows you to view your medication list as well as leave messages for your physician. Are you ready to get connected? If so please review the attatched instructions or speak to any of our staff to get you set up right away! Thank you so much for your cooperation. Should you have any questions please contact our Practice Administrator.     The Physicians and Staff,  [facility]           Please bring all medication bottles, including vitamins, supplements and any over-the-counter medications, to your next office visit.

## 2017-10-19 NOTE — PROGRESS NOTES
Neurology Consult Note      HISTORY PROVIDED BY: patient    Chief Complaint:   Chief Complaint   Patient presents with    Memory Loss     f/u      Subjective:   Pt is a 75 y.o. left handed male last seen in clinic on 4/13/17 in f/u for dementia, possible FTD vs AD, with progressive memory loss first noticed around 2013, becoming very noticeable in 2015 with Neuropsychological testing by Dr. Shanthi Montelongo in March, 2015 with data supporting a memory issue as well as for other cognitive issues including fluency and naming. 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 MRI brain w/wo contrast 6/19/15 with left temporal atrophy with asymmetric lateral ventricle on the left. His wife denied any significant change in personality, no compulsive behaviors, no disinhibition, no apathy, but does appear to have increased anxiety at this time. Exam was non-focal, MMSE score at 10/2016 visit 24/30, at last visit 23/30 missing 1 orientation, 1 attn, 3/3 at delayed recall, unable to name 1/2 items describing item in extraordinary detail, unable to write a sentence. Continued noted defensiveness and argumentative at times, without insight into situation. Started Namzaric 7mg-10mg and titrate to 28mg-10mg, given titration kit and Rx for full dose. Instructed to stop Aricept. Suggested they call in 2 months, if doing well on Namzaric, then start Effexor XR 37.5 for mood, appears to have a great deal of anxiety. He returns for f/u. They never called with an update on Namzaric. He is tolerating it well. He is not driving, he is able to stay alone. Walking 2-3 days a week, 4 miles at a time. Babysitting his grand children. She asks about starting the mood medication, she feels like he takes out his frustration/bordom on her.     Past Medical History:   Diagnosis Date    Altered glucose metabolism     Hypercholesterolemia     Hypertension     Rosacea Past Surgical History:   Procedure Laterality Date    CHEST SURGERY PROCEDURE UNLISTED      rib fracturewith hematoma    HX APPENDECTOMY      HX COLONOSCOPY  2013    10 years    HX ORTHOPAEDIC      heel injury requiring skin graft    HX TONSILLECTOMY        Social History     Social History    Marital status:      Spouse name: N/A    Number of children: N/A    Years of education: N/A     Occupational History   [employer] family [employer] Retired     Social History Main Topics    Smoking status: Never Smoker    Smokeless tobacco: Never Used    Alcohol use No    Drug use: No    Sexual activity: Not on file     Other Topics Concern    Not on file     Social History Narrative    , retired . Family History   Problem Relation Age of Onset   [facility] Arthritis-osteo Mother     Heart Disease Father 58     acute MI    Heart Disease Paternal Grandfather 62         Objective:   Review of Systems : Per HPI, o/w neg      No Known Allergies     Meds:  Outpatient Medications Prior to Visit   Medication Sig Dispense Refill    pravastatin (PRAVACHOL) 40 mg tablet Take 1 Tab by mouth nightly. 90 Tab 3    lisinopril (PRINIVIL, ZESTRIL) 10 mg tablet TAKE ONE TABLET BY MOUTH ONCE DAILY 90 Tab 3    cyanocobalamin (VITAMIN B-12) 1,000 mcg tablet Take 1,000 mcg by mouth daily. Indications: takes 3 days a week      memantine-donepezil \[facility]\"") 28-10 mg CSpX Take 1 Cap by mouth daily. 90 Cap 3    TURMERIC (CURCUMIN) by Does Not Apply route.  VIT C/E/B6/FA/B12/ARGIN/PEP XT (CARDIOTEK, BIOPERINE, PO) Take  by mouth.  magnesium 250 mg tab Take  by mouth daily. Indications: takes 3 times a week      multivitamin (ONE A DAY) tablet Take 1 Tab by mouth daily.  aspirin delayed-release 81 mg tablet Take 81 mg by mouth every three (3) days.  doxycycline (MONODOX) 100 mg capsule Take 100 mg by mouth every other day.  Rosacea   Indications: INHALED ANTHRAX       No facility-administered medications prior to visit. Imaging:  MRI Results (most recent):    Results from Abstract encounter on 11/02/16   MRI BRAIN W WO CONT   CT Results (most recent):  No results found for this or any previous visit. Reviewed records in [employer] [facility] [employer] tab today    Lab Review   Results for orders placed or performed in visit on 08/07/17   LIPID PANEL   Result Value Ref Range    Cholesterol, total 265 (H) 100 - 199 mg/dL    Triglyceride 134 0 - 149 mg/dL    HDL Cholesterol 65 >39 mg/dL    VLDL, calculated 27 5 - 40 mg/dL    LDL, calculated 173 (H) 0 - 99 mg/dL   CBC WITH AUTOMATED DIFF   Result Value Ref Range    WBC 7.7 3.4 - 10.8 x10E3/uL    RBC 4.89 4.14 - 5.80 x10E6/uL    HGB 15.1 12.6 - 17.7 g/dL    HCT 44.7 37.5 - 51.0 %    MCV 91 79 - 97 fL    MCH 30.9 26.6 - 33.0 pg    MCHC 33.8 31.5 - 35.7 g/dL    RDW 13.4 12.3 - 15.4 %    PLATELET 130 689 - 212 x10E3/uL    NEUTROPHILS 67 Not Estab. %    Lymphocytes 24 Not Estab. %    MONOCYTES 6 Not Estab. %    EOSINOPHILS 2 Not Estab. %    BASOPHILS 1 Not Estab. %    ABS. NEUTROPHILS 5.2 1.4 - 7.0 x10E3/uL    Abs Lymphocytes 1.9 0.7 - 3.1 x10E3/uL    ABS. MONOCYTES 0.4 0.1 - 0.9 x10E3/uL    ABS. EOSINOPHILS 0.2 0.0 - 0.4 x10E3/uL    ABS. BASOPHILS 0.0 0.0 - 0.2 x10E3/uL    IMMATURE GRANULOCYTES 0 Not Estab. %    ABS. IMM.  GRANS. 0.0 0.0 - 0.1 x10E3/uL   TSH REFLEX TO T4   Result Value Ref Range    TSH 2.170 0.450 - 1.368 uIU/mL   METABOLIC PANEL, COMPREHENSIVE   Result Value Ref Range    Glucose 98 65 - 99 mg/dL    BUN 17 8 - 27 mg/dL    Creatinine 0.79 0.76 - 1.27 mg/dL    GFR est non-AA 92 >59 mL/min/1.73    GFR est  >59 mL/min/1.73    BUN/Creatinine ratio 22 10 - 24    Sodium 144 134 - 144 mmol/L    Potassium 5.4 (H) 3.5 - 5.2 mmol/L    Chloride 101 96 - 106 mmol/L    CO2 26 18 - 29 mmol/L    Calcium 9.6 8.6 - 10.2 mg/dL    Protein, total 6.8 6.0 - 8.5 g/dL    Albumin 4.4 3.6 - 4.8 g/dL    GLOBULIN, TOTAL 2.4 1.5 - 4.5 g/dL    A-G Ratio 1.8 1.2 - 2.2 Bilirubin, total 0.5 0.0 - 1.2 mg/dL    Alk. phosphatase 67 39 - 117 IU/L    AST (SGOT) 26 0 - 40 IU/L    ALT (SGPT) 18 0 - 44 IU/L   HEMOGLOBIN A1C WITH EAG   Result Value Ref Range    Hemoglobin A1c 6.0 (H) 4.8 - 5.6 %    Estimated average glucose 126 mg/dL   CVD REPORT   Result Value Ref Range    INTERPRETATION Note         Exam:  Visit Vitals    /62    Pulse 60    Resp 18    Ht 5' 4.5\" (1.638 m)    Wt 67.9 kg (149 lb 9.6 oz)    SpO2 98%    BMI 25.28 kg/m2     General:  Alert, cooperative, no distress. Head:  Normocephalic, without obvious abnormality, atraumatic. Respiratory:  Heart:   Non-labored breathing   Neck:      Extremities:    Pulses:        Neurologic:  MS: Alert, speech intact. Language- See MMSE. Attention and fund of knowledge appropriate.   Cranial Nerves:  II: visual fields    II: pupils    II: optic disc    III,VII: ptosis none   III,IV,VI: extraocular muscles  EOMI, no nystagmus    V: facial light touch sensation     VII: facial muscle function   symmetric   VIII: hearing intact   IX: soft palate elevation     XI: trapezius strength     XI: sternocleidomastoid strength    XII: tongue       Motor:   Sensory:   Coordination:   Gait:   Reflexes:     Mini Mental State Exam 10/19/2017 4/13/2017 10/13/2016   What is the Year 0 1 1   What is the Season 1 1 0   What is the Date 1 0 0   What is the Day 1 1 1   What is the Month 1 1 1   Where are we State 1 1 1   Where are we Country 1 1 1   Where are we [address] [place] [address] or [facility] 1 1 1   [facility] Floor 1 1 1   Name three objects, then ask the patient to say them 3 3 3   Serial sevens Subtract 7 from 100 in increments 4 4 5   Ask for the three objects repeated above 1 0 0   Name a pencil 0 1 1   Name a watch 0 0 0   Have the patient repeat this phrase \"No ifs, ands, or buts\" 0 1 1   Three stage command: Take the paper in your right hand 1 1 1   Fold the paper in half 1 1 1   Put the paper on the floor 1 1 1   Read and obey the following: CLOSE YOUR EYES 1 1 1   Have the patient write a sentence 1 0 1   Have the patient copy a figure 1 1 1   Mini Mental Score 23 23 24          Assessment/Plan   Pt is a 75 y.o. left handed male with dementia, possible FTD vs AD, with progressive memory loss first noticed around 2013, becoming very noticeable in 2015 with Neuropsychological testing by Dr. Tiffani Lyons in March, 2015 with data supporting a memory issue as well as for other cognitive issues including fluency and naming. 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 MRI brain w/wo contrast 6/19/15 with left temporal atrophy with asymmetric lateral ventricle on the left. Pt has anxiety and depression, but no other significant change in personality, compulsive behaviors, disinhibition, or apathy reported by his wife. Exam is non-focal, MMSE score is stable, 23/30 missing 1 orientation, 1 attn, 2/3 at delayed recall, unable to name 2/2 items describing item in extraordinary detail, unable to repeat. Doing well on Namzaric 28mg-10mg. Start Effexor XR 37.5 for mood, side effects reviewed. Discussed importance of physical and mental exercise to slow memory loss. F/u in clinic in 6 months, instructed to call in the interim if needed. ICD-10-CM ICD-9-CM    1. Dementia without behavioral disturbance, unspecified dementia type F03.90 294.20    2. Irritability and anger R45.4 799.22        Signed:   Radha Pickard MD  10/19/2017

## 2017-10-19 NOTE — MR AVS SNAPSHOT
Visit Information Date & Time Provider Department Dept. Phone Encounter #  
 10/19/2017  3:20 PM Faye Swain MD [facility] at [address] [id number] Follow-up Instructions Return in about 6 months (around 4/19/2018). Your Appointments 2/8/2018  2:15 PM  
ROUTINE CARE with MD Keisha Roman [facility] Appt Note: 6 mths [address], [address] [place]  
[address], [address] [phone] Upcoming Health Maintenance Date Due  
 MEDICARE YEARLY EXAM 1/11/2014 FOBT Q 1 YEAR AGE 50-75 11/4/2015 GLAUCOMA SCREENING Q2Y 10/15/2016 INFLUENZA AGE 9 TO ADULT 8/1/2017 DTaP/Tdap/Td series (2 - Td) 1/1/2021 Allergies as of 10/19/2017  Review Complete On: 10/19/2017 By: Tor Kat LPN No Known Allergies Current Immunizations  Reviewed on 5/4/2017 Name Date Influenza Vaccine 10/1/2016, 10/20/2014, 11/5/2013 Pneumococcal Conjugate (PCV-13) 5/20/2016 Pneumococcal Polysaccharide (PPSV-23) 8/7/2017 Tdap 1/1/2011 Zoster Vaccine, Live 1/1/2000 Not reviewed this visit You Were Diagnosed With   
  
 Codes Comments Dementia without behavioral disturbance, unspecified dementia type    -  Primary ICD-10-CM: F03.90 ICD-9-CM: 294.20 Irritability and anger     ICD-10-CM: R45.4 ICD-9-CM: 799.22 Vitals BP Pulse Resp Height(growth percentile) Weight(growth percentile) SpO2  
 122/62 60 18 5' 4.5\" (1.638 m) 149 lb 9.6 oz (67.9 kg) 98% BMI Smoking Status 25.28 kg/m2 Never Smoker Vitals History BMI and BSA Data Body Mass Index Body Surface Area  
 25.28 kg/m 2 1.76 m 2 Preferred Pharmacy Pharmacy Name Phone [phone] [phone] Your Updated Medication List  
  
   
 This list is accurate as of: 10/19/17  3:48 PM.  Always use your most recent med list.  
  
  
  
  
 aspirin delayed-release 81 mg tablet Take 81 mg by mouth every three (3) days. CARDIOTEK (BIOPERINE) PO Take  by mouth. CURCUMIN  
by Does Not Apply route. doxycycline 100 mg capsule Commonly known as:  Donnella Span Take 100 mg by mouth every other day. Rosacea   Indications: INHALED ANTHRAX  
  
 lisinopril 10 mg tablet Commonly known as:  PRINIVIL, ZESTRIL  
TAKE ONE TABLET BY MOUTH ONCE DAILY  
  
 magnesium 250 mg Tab Take  by mouth daily. Indications: takes 3 times a week  
  
 memantine-donepezil 28-10 mg Cspx Commonly known as:  [facility] Take 1 Cap by mouth daily. multivitamin tablet Commonly known as:  ONE A DAY Take 1 Tab by mouth daily. pravastatin 40 mg tablet Commonly known as:  PRAVACHOL Take 1 Tab by mouth nightly. venlafaxine-SR 37.5 mg capsule Commonly known as:  EFFEXOR-XR Take 1 Cap by mouth daily. VITAMIN B-12 1,000 mcg tablet Generic drug:  cyanocobalamin Take 1,000 mcg by mouth daily. Indications: takes 3 days a week  
  
 vitamin E 400 unit capsule Commonly known as:  [address] Take 400 Units by mouth daily. Prescriptions Sent to Pharmacy Refills  
 venlafaxine-SR (EFFEXOR-XR) 37.5 mg capsule 3 Sig: Take 1 Cap by mouth daily. Class: Normal  
 Pharmacy: [facility] [place] [address] #: [phone] Route: Oral  
  
Follow-up Instructions Return in about 6 months (around 4/19/2018). Patient Instructions PRESCRIPTION REFILL POLICY [facility] Statement to Patients April 1, 2014 In an effort to ensure the large volume of patient prescription refills is processed in the most efficient and expeditious manner, we are asking our patients to assist us by calling your Pharmacy for all prescription refills, this will include also your  Mail Order Pharmacy. The pharmacy will contact our office electronically to continue the refill process. Please do not wait until the last minute to call your pharmacy. We need at least 48 hours (2days) to fill prescriptions. We also encourage you to call your pharmacy before going to  your prescription to make sure it is ready. With regard to controlled substance prescription refill requests (narcotic refills) that need to be picked up at our office, we ask your cooperation by providing us with at least 72 hours (3days) notice that you will need a refill. We will not refill narcotic prescription refill requests after 4:00pm on any weekday, Monday through Thursday, or after 2:00pm on Fridays, or on the weekends. We encourage everyone to explore another way of getting your prescription refill request processed using [employer], our patient web portal through our electronic medical record system. [employer] is an efficient and effective way to communicate your medication request directly to the office and  downloadable as an hood on your smart phone . [employer] also features a review functionality that allows you to view your medication list as well as leave messages for your physician. Are you ready to get connected? If so please review the attatched instructions or speak to any of our staff to get you set up right away! Thank you so much for your cooperation. Should you have any questions please contact our Practice Administrator. The Physicians and Staff,  [facility] Please bring all medication bottles, including vitamins, supplements and any over-the-counter medications, to your next office visit. Introducing [facility]! Dear Haroon Panda: Thank you for requesting a [employer] account.   Our records indicate that you have previously registered for a [employer] account but its currently inactive. Please call our Advanced Patient Care support line at [phone]. Additional Information If you have questions, please visit the Frequently Asked Questions section of the Advanced Patient Care website at [email or web address] Remember, Advanced Patient Care is NOT to be used for urgent needs. For medical emergencies, dial 911. Now available from your iPhone and Android! Please provide this summary of care documentation to your next provider. Your primary care clinician is listed as Joey Palencia. If you have any questions after today's visit, please call [phone].

## 2017-12-20 ENCOUNTER — HOSPITAL ENCOUNTER (OUTPATIENT)
Dept: LAB | Age: 67
Discharge: HOME | End: 2017-12-20
Payer: MEDICARE

## 2017-12-20 DIAGNOSIS — E78.00 HYPERCHOLESTEROLEMIA: ICD-10-CM

## 2017-12-20 PROCEDURE — 80061 LIPID PANEL: CPT

## 2017-12-20 PROCEDURE — 80053 COMPREHEN METABOLIC PANEL: CPT

## 2017-12-20 PROCEDURE — 36415 COLL VENOUS BLD VENIPUNCTURE: CPT

## 2017-12-21 LAB
ALBUMIN SERPL-MCNC: 4.2 G/DL (ref 3.6–4.8)
ALBUMIN/GLOB SERPL: 1.9 {RATIO} (ref 1.2–2.2)
ALP SERPL-CCNC: 60 IU/L (ref 39–117)
ALT SERPL-CCNC: 22 IU/L (ref 0–44)
AST SERPL-CCNC: 20 IU/L (ref 0–40)
BILIRUB SERPL-MCNC: 0.3 MG/DL (ref 0–1.2)
BUN SERPL-MCNC: 23 MG/DL (ref 8–27)
BUN/CREAT SERPL: 28 (ref 10–24)
CALCIUM SERPL-MCNC: 9.2 MG/DL (ref 8.6–10.2)
CHLORIDE SERPL-SCNC: 103 MMOL/L (ref 96–106)
CHOLEST SERPL-MCNC: 165 MG/DL (ref 100–199)
CO2 SERPL-SCNC: 30 MMOL/L (ref 18–29)
CREAT SERPL-MCNC: 0.83 MG/DL (ref 0.76–1.27)
GLOBULIN SER CALC-MCNC: 2.2 G/DL (ref 1.5–4.5)
GLUCOSE SERPL-MCNC: 106 MG/DL (ref 65–99)
HDLC SERPL-MCNC: 63 MG/DL
INTERPRETATION, 910389: NORMAL
LDLC SERPL CALC-MCNC: 87 MG/DL (ref 0–99)
POTASSIUM SERPL-SCNC: 5 MMOL/L (ref 3.5–5.2)
PROT SERPL-MCNC: 6.4 G/DL (ref 6–8.5)
SODIUM SERPL-SCNC: 144 MMOL/L (ref 134–144)
TRIGL SERPL-MCNC: 74 MG/DL (ref 0–149)
VLDLC SERPL CALC-MCNC: 15 MG/DL (ref 5–40)

## 2017-12-21 NOTE — PROGRESS NOTES
Let wife know his cholesterol improved a lot and stay on same medication  Signed electronically by: Neo Rivas MD at 4:19 PM on December 21, 2017

## 2018-02-08 ENCOUNTER — OFFICE VISIT (OUTPATIENT)
Dept: INTERNAL MEDICINE CLINIC | Age: 68
End: 2018-02-08

## 2018-02-08 VITALS
HEART RATE: 65 BPM | OXYGEN SATURATION: 96 % | WEIGHT: 151.4 LBS | SYSTOLIC BLOOD PRESSURE: 130 MMHG | BODY MASS INDEX: 25.85 KG/M2 | RESPIRATION RATE: 15 BRPM | DIASTOLIC BLOOD PRESSURE: 80 MMHG | TEMPERATURE: 98 F | HEIGHT: 64 IN

## 2018-02-08 DIAGNOSIS — R73.03 PRE-DIABETES: ICD-10-CM

## 2018-02-08 DIAGNOSIS — E78.00 HYPERCHOLESTEROLEMIA: ICD-10-CM

## 2018-02-08 DIAGNOSIS — Z13.31 SCREENING FOR DEPRESSION: ICD-10-CM

## 2018-02-08 DIAGNOSIS — Z00.00 MEDICARE ANNUAL WELLNESS VISIT, SUBSEQUENT: ICD-10-CM

## 2018-02-08 DIAGNOSIS — F03.90 DEMENTIA WITHOUT BEHAVIORAL DISTURBANCE, UNSPECIFIED DEMENTIA TYPE: Primary | ICD-10-CM

## 2018-02-08 DIAGNOSIS — I10 BENIGN ESSENTIAL HYPERTENSION: ICD-10-CM

## 2018-02-08 RX ORDER — AA/PROT/LYSINE/METHIO/VIT C/B6 50-12.5 MG
1 TABLET ORAL
COMMUNITY

## 2018-02-08 RX ORDER — MELATONIN
1000 DAILY
COMMUNITY

## 2018-02-08 NOTE — PROGRESS NOTES
Hypertension (6 month follow up)       HPI:  Fanny Andrews. is a 70y.o. year old male who is here for a follow up visit. He was last seen by me on 12/20/2017. He reports the following: On pravachol now LDL is very good. Meeting Monday with Dr. Phil Wheat (clinical research on Alzhemier's disease)    Wife present here today. Hypertension    Patient has a history of HTN. The patient is taking hypertensive medications compliantly without side effects. Denies chest pain, dyspnea, edema, or symptoms of TIA's. BP Readings from Last 3 Encounters:   02/08/18 130/80   10/19/17 122/62   08/07/17 130/80       no dizziness or cough. Hyperlipidemia    Patient has history of hyperlipidemia that is being managed with medications. He has been taking his statin cholesterol medication regularly without side effects such as myalgias or upper abdominal pain, nausea or jaundice. Lab Results   Component Value Date/Time    Cholesterol, total 165 12/20/2017 08:20 AM    HDL Cholesterol 63 12/20/2017 08:20 AM    LDL, calculated 87 12/20/2017 08:20 AM    VLDL, calculated 15 12/20/2017 08:20 AM    Triglyceride 74 12/20/2017 08:20 AM    CHOL/HDL Ratio 2.4 06/07/2010 12:40 PM       water soluble statin - tolerates. Assessment and Plan        1. Dementia without behavioral disturbance, unspecified dementia type  Stable. Medications expensive. Trying to get in clinical trial.  No erratic behavior. Sleeping not too much. Stimuli increase. 2. Screening for depression  PHQ over the last two weeks 8/7/2017   Little interest or pleasure in doing things Not at all   Feeling down, depressed or hopeless Not at all   Total Score PHQ 2 0        3. Medicare annual wellness visit, subsequent  Completed. Wife is HCP. Patient says he is full code. Wife was surprised as he was DNR in the past.  He says that things have changed and wishes to be full code.      4. Hypercholesterolemia  The nature of cardiac risk has been fully discussed with this patient. I have made him aware of his LDL target goal given his cardiovascular risk analysis. I have discussed the appropriate diet. The need for lifelong compliance in order to reduce risk is stressed. A regular exercise program is recommended to help achieve and maintain normal body weight, fitness and improve lipid balance. The risks and benefits of medications were discussed. Last cholesterol labs reviewed with patient. Patient made aware to get liver checked every 6 months. Continue with  Pravachol. 5. Benign essential hypertension  Tolerating medication. Denies dizziness that is positional, SOB, or chest pain. Understands the importance of compliance to reduce risk of future heart failure. Agreed to call if any of above symptoms develop and  stay on current regimen of      6. Pre-diabetes  Reduce carb intake. Visit Vitals    /80 (BP 1 Location: Left arm, BP Patient Position: Sitting)    Pulse 65    Temp 98 °F (36.7 °C) (Oral)    Resp 15    Ht 5' 4\" (1.626 m)    Wt 151 lb 6.4 oz (68.7 kg)    SpO2 96%    BMI 25.99 kg/m2       Historical Data    Past Medical History:   Diagnosis Date    Altered glucose metabolism     Hypercholesterolemia     Hypertension     Rosacea        Past Surgical History:   Procedure Laterality Date    CHEST SURGERY PROCEDURE UNLISTED      rib fracturewith hematoma    HX APPENDECTOMY      HX COLONOSCOPY  2013    10 years    HX ORTHOPAEDIC      heel injury requiring skin graft    HX TONSILLECTOMY         Outpatient Encounter Prescriptions as of 2/8/2018   Medication Sig Dispense Refill    coenzyme q10 (CO Q-10) 10 mg cap Take  by mouth.  cholecalciferol (VITAMIN D3) 1,000 unit tablet Take  by mouth daily.  vitamin E (AQUA GEMS) 400 unit capsule Take 400 Units by mouth daily.  venlafaxine-SR (EFFEXOR-XR) 37.5 mg capsule Take 1 Cap by mouth daily.  90 Cap 3    pravastatin (PRAVACHOL) 40 mg tablet Take 1 Tab by mouth nightly. 90 Tab 3    lisinopril (PRINIVIL, ZESTRIL) 10 mg tablet TAKE ONE TABLET BY MOUTH ONCE DAILY 90 Tab 3    cyanocobalamin (VITAMIN B-12) 1,000 mcg tablet Take 1,000 mcg by mouth daily. Indications: takes 3 days a week      memantine-donepezil [facility]) 28-10 mg CSpX Take 1 Cap by mouth daily. 90 Cap 3    TURMERIC (CURCUMIN) by Does Not Apply route.  magnesium 250 mg tab Take  by mouth daily. Indications: takes 3 times a week      multivitamin (ONE A DAY) tablet Take 1 Tab by mouth daily.  aspirin delayed-release 81 mg tablet Take 81 mg by mouth every three (3) days.  doxycycline (MONODOX) 100 mg capsule Take 100 mg by mouth. Rosacea   Indications: INHALED ANTHRAX, Every 3 days      VIT C/E/B6/FA/B12/ARGIN/PEP XT (CARDIOTEK, BIOPERINE, PO) Take  by mouth. No facility-administered encounter medications on file as of 2/8/2018. 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 family history includes Arthritis-osteo in his mother; Heart Disease (age of onset: 62) in his paternal grandfather; Heart Disease (age of onset: 58) in his father. Review of Systems   Constitutional: Negative for weight loss. Eyes: Negative for blurred vision. Respiratory: Negative for shortness of breath. Cardiovascular: Negative for chest pain. Gastrointestinal: Negative for abdominal pain. Genitourinary: Negative for dysuria and frequency. Skin: Negative for rash.    Neurological: Negative for dizziness, focal weakness, weakness and headaches. Endo/Heme/Allergies: Negative for environmental allergies. Does not bruise/bleed easily. Physical Exam   Constitutional: He appears well-developed and well-nourished. He is active. Non-toxic appearance. He does not have a sickly appearance. He does not appear ill. No distress. Eyes: Conjunctivae are normal. Right eye exhibits no discharge. Cardiovascular: Normal rate, regular rhythm, S1 normal, S2 normal, normal heart sounds and normal pulses. Exam reveals no gallop and no friction rub. Pulmonary/Chest: Effort normal and breath sounds normal. No respiratory distress. Abdominal: Soft. Bowel sounds are normal.   Musculoskeletal: He exhibits no edema or deformity. Neurological: He is alert. Skin: Skin is warm and dry. No rash noted. No pallor. Psychiatric: He has a normal mood and affect. His behavior is normal.   Vitals reviewed. Ortho Exam      Orders Placed This Encounter    coenzyme q10 (CO Q-10) 10 mg cap     Sig: Take  by mouth.  cholecalciferol (VITAMIN D3) 1,000 unit tablet     Sig: Take  by mouth daily. I have reviewed the patient's medical history in detail and updated the computerized patient record. We had a prolonged discussion about these complex clinical issues and went over the various important aspects to consider. All questions were answered. Advised him to call back or return to office if symptoms do not improve, change in nature, or persist.    He was given an after visit summary or informed of [employer] [facility] which includes patient instructions, diagnoses, current medications, & vitals. He expressed understanding with the diagnosis and plan. Sanford Walton is a 70 y.o. male and presents for annual Medicare Wellness Visit. Assessment of cognitive impairment: Alert and oriented x 3. Patient reports cognitive impairment. Problem List: Reviewed with patient and discussed risk factors.     Patient Active Problem List   Diagnosis Code    Benign essential hypertension I10    Hypercholesterolemia E78.00    Pre-diabetes R73.03    H/O colonoscopy Z98.890    Memory loss R41.3    Dementia without behavioral disturbance F03.90       Current medical providers:  Patient Care Team:  Wil Danielle MD as PCP - General (Internal Medicine)        End of Life Planning: This was discussed with him today and he has an advanced directive - a copy HAS NOT been provided. Reviewed DNR/DNI and patient is no. Depression Screen: Reviewed PQH2 done by nurse. PHQ over the last two weeks 8/7/2017   Little interest or pleasure in doing things Not at all   Feeling down, depressed or hopeless Not at all   Total Score PHQ 2 0       Fall Risk:   Fall Risk Assessment, last 12 mths 8/7/2017   Able to walk? Yes   Fall in past 12 months? No       Home Safety - discussion completed. No issues found. Hearing Loss:  Hearing is good. denies any hearing loss    Activities of Daily Living:  Self-care. Requires assistance with: no ADLs  Does not drive. Adult Nutrition Screen:  No risk factors noted. Health Maintenance- Reviewed    AAA Screening:   Glaucoma Screening:  Doing well.  - advised to get eyes checked. Health Maintenance Due   Topic Date Due    MEDICARE YEARLY EXAM  01/11/2014    GLAUCOMA SCREENING Q2Y  10/15/2016        Health Maintenance reviewed -  Plan:      Orders Placed This Encounter    coenzyme q10 (CO Q-10) 10 mg cap    cholecalciferol (VITAMIN D3) 1,000 unit tablet           Plan:      Orders Placed This Encounter    coenzyme q10 (CO Q-10) 10 mg cap    cholecalciferol (VITAMIN D3) 1,000 unit tablet         Follow-up Disposition:  Return in about 6 months (around 8/8/2018) for For yearly physical 30 minutes. Reviewed with patient the treatment plan, goals of treatment plan, and limitations of treatment plan, to include the potential for side effects from medications and procedures.  If side effects occur, it is the responsibility of the patient to inform the clinic so that a change in the treatment plan can be made in a safe manner. The patient is advised that stopping prescribed medication may cause an increase in symptoms and possible medication withdrawal symptoms. The patient is informed an emergency room evaluation may be necessary if this occurs. Patient verbalized understanding and is in agreement with treatment plan as outlined above. All questions answered.

## 2018-02-15 ENCOUNTER — TELEPHONE (OUTPATIENT)
Dept: NEUROLOGY | Age: 68
End: 2018-02-15

## 2018-02-15 RX ORDER — DONEPEZIL HYDROCHLORIDE 10 MG/1
10 TABLET, FILM COATED ORAL
Qty: 30 TAB | Refills: 11 | Status: SHIPPED | OUTPATIENT
Start: 2018-02-15 | End: 2019-03-01 | Stop reason: SDUPTHER

## 2018-02-15 RX ORDER — MEMANTINE HYDROCHLORIDE 10 MG/1
10 TABLET ORAL 2 TIMES DAILY
Qty: 60 TAB | Refills: 11 | Status: SHIPPED | OUTPATIENT
Start: 2018-02-15 | End: 2019-03-01 | Stop reason: SDUPTHER

## 2018-02-15 NOTE — TELEPHONE ENCOUNTER
Pt wife returning phone call from nurse, stated that her phone never rang, only received VM, requesting returned call on home number [phone]

## 2018-02-15 NOTE — TELEPHONE ENCOUNTER
Spoke with patient's wife and informed her of Dr. Amparo Hanks recommendations. She stated that she ended up picking up the Namzaric as she did not receive a response back from our office until he was close to running out. Mrs. Ezra Almazan was given an opportunity to ask questions, repeated information, and verbalized understanding.

## 2018-02-15 NOTE — TELEPHONE ENCOUNTER
Received a letter from pt's wife expressing concern regarding expense of Namzaric. Jayy - Please call pt's wife. I have switched him from Namzaric to Aricept 10mg every day and Memantine 10mg bid. She can start these two generics the day after he takes his last Namzaric. There is no need for a transition period as she suggested in her letter.

## 2018-04-19 ENCOUNTER — OFFICE VISIT (OUTPATIENT)
Dept: NEUROLOGY | Age: 68
End: 2018-04-19

## 2018-04-19 VITALS
SYSTOLIC BLOOD PRESSURE: 112 MMHG | OXYGEN SATURATION: 97 % | BODY MASS INDEX: 25.44 KG/M2 | WEIGHT: 149 LBS | RESPIRATION RATE: 18 BRPM | HEART RATE: 61 BPM | DIASTOLIC BLOOD PRESSURE: 62 MMHG | HEIGHT: 64 IN

## 2018-04-19 DIAGNOSIS — F03.90 DEMENTIA WITHOUT BEHAVIORAL DISTURBANCE, UNSPECIFIED DEMENTIA TYPE: Primary | ICD-10-CM

## 2018-04-19 DIAGNOSIS — R45.4 IRRITABILITY AND ANGER: ICD-10-CM

## 2018-04-19 RX ORDER — MEMANTINE HYDROCHLORIDE AND DONEPEZIL HYDROCHLORIDE 28; 10 MG/1; MG/1
CAPSULE ORAL
COMMUNITY
Start: 2018-02-04 | End: 2018-04-19 | Stop reason: ALTCHOICE

## 2018-04-19 NOTE — PROGRESS NOTES
Neurology Consult Note      HISTORY PROVIDED BY: patient    Chief Complaint:   Chief Complaint   Patient presents with    Memory Loss      Subjective:   Pt is a [place] y.o. left handed male last seen in clinic on 10/19/17 in f/u for dementia, possible FTD vs AD, with progressive memory loss first noticed around 2013, becoming very noticeable in 2015 with Neuropsychological testing by Dr. Tanya Dempsey in March, 2015 with data supporting a memory issue as well as for other cognitive issues including fluency and naming. 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 MRI brain w/wo contrast 6/19/15 with left temporal atrophy with asymmetric lateral ventricle on the left. Pt has anxiety and depression, but no other significant change in personality, compulsive behaviors, disinhibition, or apathy reported by his wife. Exam was non-focal, MMSE score was stable, 23/30 missing 1 orientation, 1 attn, 2/3 at delayed recall, unable to name 2/2 items describing item in extraordinary detail, unable to repeat. Doing well on Namzaric 28mg-10mg. Started Effexor XR 37.5 for mood. Discussed importance of physical and mental exercise to slow memory loss. F/u in clinic in 6 months. He returns for f/u. His wife sent a letter expressing concern about the expense of Namzaric, so he was switched to Aricept 10mg every day and Memantine 10mg bid; however, she had the Namzaric filled for 6 months, so has not switched to Aricept/memantine yet. He was just enrolled in a clinical trial for a new dementia medication and has up coming MRI and PET scan planned. They are going to gym 3 days a week. He is not driving, the pt states b/c she sold his car. They have ongoing argument regarding management of financial issues. She reports the Effexor has helped his mood somewhat.     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 Family History   Problem Relation Age of Onset   [de-identified] Arthritis-osteo Mother     Heart Disease Father 58     acute MI    Heart Disease Paternal Grandfather 62         Objective:   Review of Systems : Per HPI, o/w neg      No Known Allergies     Meds:  Outpatient Medications Prior to Visit   Medication Sig Dispense Refill    donepezil (ARICEPT) 10 mg tablet Take 1 Tab by mouth nightly. 30 Tab 11    memantine (NAMENDA) 10 mg tablet Take 1 Tab by mouth two (2) times a day. 60 Tab 11    coenzyme q10 (CO Q-10) 10 mg cap Take  by mouth.  cholecalciferol (VITAMIN D3) 1,000 unit tablet Take  by mouth daily.  vitamin E (AQUA GEMS) 400 unit capsule Take 400 Units by mouth daily.  venlafaxine-SR (EFFEXOR-XR) 37.5 mg capsule Take 1 Cap by mouth daily. 90 Cap 3    pravastatin (PRAVACHOL) 40 mg tablet Take 1 Tab by mouth nightly. 90 Tab 3    lisinopril (PRINIVIL, ZESTRIL) 10 mg tablet TAKE ONE TABLET BY MOUTH ONCE DAILY 90 Tab 3    cyanocobalamin (VITAMIN B-12) 1,000 mcg tablet Take 1,000 mcg by mouth daily. Indications: takes 3 days a week      TURMERIC (CURCUMIN) by Does Not Apply route.       VIT C/E/B6/FA/B12/ARGIN/PEP XT (CARDIOTEK, BIOPERINE, PO) Take  by mouth.  magnesium 250 mg tab Take  by mouth daily. Indications: takes 3 times a week      multivitamin (ONE A DAY) tablet Take 1 Tab by mouth daily.  aspirin delayed-release 81 mg tablet Take 81 mg by mouth every three (3) days.  doxycycline (MONODOX) 100 mg capsule Take 100 mg by mouth. Rosacea   Indications: INHALED ANTHRAX, Every 3 days       No facility-administered medications prior to visit. Imaging:  MRI Results (most recent):    Results from Abstract encounter on 11/02/16   MRI BRAIN W WO CONT   CT Results (most recent):  No results found for this or any previous visit. Reviewed records in [facility] and [facility] tab today    Lab Review   Results for orders placed or performed in visit on 12/20/17   LIPID PANEL   Result Value Ref Range    Cholesterol, total 165 100 - 199 mg/dL    Triglyceride 74 0 - 149 mg/dL    HDL Cholesterol 63 >39 mg/dL    VLDL, calculated 15 5 - 40 mg/dL    LDL, calculated 87 0 - 99 mg/dL   METABOLIC PANEL, COMPREHENSIVE   Result Value Ref Range    Glucose 106 (H) 65 - 99 mg/dL    BUN 23 8 - 27 mg/dL    Creatinine 0.83 0.76 - 1.27 mg/dL    GFR est non-AA 90 >59 mL/min/1.73    GFR est  >59 mL/min/1.73    BUN/Creatinine ratio 28 (H) 10 - 24    Sodium 144 134 - 144 mmol/L    Potassium 5.0 3.5 - 5.2 mmol/L    Chloride 103 96 - 106 mmol/L    CO2 30 (H) 18 - 29 mmol/L    Calcium 9.2 8.6 - 10.2 mg/dL    Protein, total 6.4 6.0 - 8.5 g/dL    Albumin 4.2 3.6 - 4.8 g/dL    GLOBULIN, TOTAL 2.2 1.5 - 4.5 g/dL    A-G Ratio 1.9 1.2 - 2.2    Bilirubin, total 0.3 0.0 - 1.2 mg/dL    Alk. phosphatase 60 39 - 117 IU/L    AST (SGOT) 20 0 - 40 IU/L    ALT (SGPT) 22 0 - 44 IU/L   CVD REPORT   Result Value Ref Range    INTERPRETATION Note         Exam:  Visit Vitals    /62    Pulse 61    Resp 18    Ht 5' 4\" (1.626 m)    Wt 67.6 kg (149 lb)    SpO2 97%    BMI 25.58 kg/m2     General:  Alert, cooperative, no distress.    Head:  Normocephalic, without obvious abnormality, atraumatic. Respiratory:  Heart:   Non-labored breathing  RRR, no murmurs   Neck:      Extremities:    Pulses: 2+ radial pulses       Neurologic:  MS: Alert, speech intact. Language- severely impaired, word finding, difficulty with nouns. Attention and fund of knowledge appropriate. Memory impaired. Cranial Nerves:  II: visual fields    II: pupils    II: optic disc    III,VII: ptosis none   III,IV,VI: extraocular muscles  EOMI, no nystagmus    V: facial light touch sensation     VII: facial muscle function   symmetric   VIII: hearing intact   IX: soft palate elevation     XI: trapezius strength     XI: sternocleidomastoid strength    XII: tongue       Motor:   Sensory:   Coordination:   Gait:   Reflexes:     Mini Mental State Exam 10/19/2017 4/13/2017 10/13/2016   What is the Year 0 1 1   What is the Season 1 1 0   What is the Date 1 0 0   What is the Day 1 1 1   What is the Month 1 1 1   Where are we State 1 1 1   Where are we Country 1 1 1   Where are we [place] or City 1 1 1   Whree are we Floor 1 1 1   Name three objects, then ask the patient to say them 3 3 3   Serial sevens Subtract 7 from 100 in increments 4 4 5   Ask for the three objects repeated above 1 0 0   Name a pencil 0 1 1   Name a watch 0 0 0   Have the patient repeat this phrase \"No ifs, ands, or buts\" 0 1 1   Three stage command: Take the paper in your right hand 1 1 1   Fold the paper in half 1 1 1   Put the paper on the floor 1 1 1   Read and obey the following: CLOSE YOUR EYES 1 1 1   Have the patient write a sentence 1 0 1   Have the patient copy a figure 1 1 1   Mini Mental Score 23 23 24          Assessment/Plan   Pt is a 70 y.o. left handed male with dementia, possible FTD vs AD, with progressive memory loss first noticed around 2013, becoming very noticeable in 2015 with Neuropsychological testing by Dr. Kristy Carrillo in March, 2015 with data supporting a memory issue as well as for other cognitive issues including fluency and naming. 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 MRI brain w/wo contrast 6/19/15 with left temporal atrophy with asymmetric lateral ventricle on the left. Pt has anxiety and depression, but no other significant change in personality, compulsive behaviors, disinhibition, or apathy reported by his wife. Exam was non-focal, MMSE score 10/19/17 was stable, 23/30. Continue Effexor XR 37.5 for mood. He will start Aricept 10mg daily and Memantine 10mg bid in a couple of weeks when Namzaric runs out. We discussed financial issues at length, but having a rational discussion has been difficult due to his dementia and paranoia related to this. Will check repeat MMSE at next visit. F/u in 6 months, instructed to call if needed. ICD-10-CM ICD-9-CM    1. Dementia without behavioral disturbance, unspecified dementia type F03.90 294.20    2. Irritability and anger R45.4 799.22        Signed:   Jj Estrada MD  4/19/2018

## 2018-04-19 NOTE — MR AVS SNAPSHOT
[facility] [address] [place] [address] 
[phone] Patient: Golden Claude. MRN: [id number] LXX:[id number] Visit Information Date & Time Provider Department Dept. Phone Encounter #  
 4/19/2018  3:20 PM MD Rosa Chambersjanes [facility] at [address] [id number] Follow-up Instructions Return in about 6 months (around 10/19/2018). Your Appointments 8/7/2018  1:45 PM  
Complete Physical with MD Annamarie Miguelva [facility]) Appt Note: 6 mths  physical  
 [address], [address]  
[address], Radha Keara De Sawpitperi [address] [place] [address] 7 [id number] Upcoming Health Maintenance Date Due  
 GLAUCOMA SCREENING Q2Y 10/15/2016 MEDICARE YEARLY EXAM 2/9/2019 DTaP/Tdap/Td series (2 - Td) 1/1/2021 COLONOSCOPY 11/27/2023 Allergies as of 4/19/2018  Review Complete On: 4/19/2018 By: Fany Carlson LPN No Known Allergies Current Immunizations  Reviewed on 5/4/2017 Name Date Influenza High Dose Vaccine PF 10/16/2017 Influenza Vaccine 10/1/2016, 10/20/2014, 11/5/2013 Pneumococcal Conjugate (PCV-13) 5/20/2016 Pneumococcal Polysaccharide (PPSV-23) 8/7/2017 Tdap 1/1/2011 Zoster Vaccine, Live 1/1/2000 Not reviewed this visit You Were Diagnosed With   
  
 Codes Comments Dementia without behavioral disturbance, unspecified dementia type    -  Primary ICD-10-CM: F03.90 ICD-9-CM: 294.20 Irritability and anger     ICD-10-CM: R45.4 ICD-9-CM: 799.22 Vitals BP Pulse Resp Height(growth percentile) Weight(growth percentile) SpO2  
 112/62 61 18 5' 4\" (1.626 m) 149 lb (67.6 kg) 97% BMI Smoking Status 25.58 kg/m2 Never Smoker Vitals History BMI and BSA Data Body Mass Index Body Surface Area  25.58 kg/m 2 1.75 m 2  
  
  
 Preferred Pharmacy Pharmacy Name Phone [address] [place] [address], [address] [place] [address] Rd. [phone] Your Updated Medication List  
  
   
This list is accurate as of 4/19/18  3:48 PM.  Always use your most recent med list.  
  
  
  
  
 aspirin delayed-release 81 mg tablet Take 81 mg by mouth every three (3) days. CARDIOTEK (BIOPERINE) PO Take  by mouth. CO Q-10 10 mg Cap Generic drug:  coenzyme q10 Take  by mouth. CURCUMIN  
by Does Not Apply route. donepezil 10 mg tablet Commonly known as:  ARICEPT Take 1 Tab by mouth nightly. doxycycline 100 mg capsule Commonly known as:  Anshul Dora Take 100 mg by mouth. Rosacea   Indications: INHALED ANTHRAX, Every 3 days  
  
 lisinopril 10 mg tablet Commonly known as:  PRINIVIL, ZESTRIL  
TAKE ONE TABLET BY MOUTH ONCE DAILY  
  
 magnesium 250 mg Tab Take  by mouth daily. Indications: takes 3 times a week  
  
 memantine 10 mg tablet Commonly known as:  Saad Christopher Take 1 Tab by mouth two (2) times a day. multivitamin tablet Commonly known as:  ONE A DAY Take 1 Tab by mouth daily. pravastatin 40 mg tablet Commonly known as:  PRAVACHOL Take 1 Tab by mouth nightly. venlafaxine-SR 37.5 mg capsule Commonly known as:  EFFEXOR-XR Take 1 Cap by mouth daily. VITAMIN B-12 1,000 mcg tablet Generic drug:  cyanocobalamin Take 1,000 mcg by mouth daily. Indications: takes 3 days a week VITAMIN D3 1,000 unit tablet Generic drug:  cholecalciferol Take  by mouth daily. vitamin E 400 unit capsule Commonly known as:  [address] Take 400 Units by mouth daily. Follow-up Instructions Return in about 6 months (around 10/19/2018). To-Do List   
 04/24/2018 1:30 PM  
(Arrive by 1:15 PM) Appointment with LANCE HUNTER 1 at [facility] MRI ([phone]) 1.  Please bring a list or a bag of your current medications to your appointment 2. Please be sure to remove ALL hair clips, pins, extensions, etc., prior to arriving for your MRI procedure. 3. If you have any medical implants or devices, please bring associated medical card with you. 4. Bring any non [facility] films or CDs pertaining to the area being imaged with you on the day of appointment. 5. A written order with a valid diagnosis and Physicians  signature is required for all scheduled tests. 6. Check in at registration 30min before your appointment time unless you were instructed to do otherwise. Please arrive 15 minutes prior to appointment to register. 05/01/2018 2:00 PM  
  Appointment with [facility] PET 1 at [facility] RAD PET ([phone]) 1. Patient should arrive 30 minutes early to register. Patient's entire visit to the hospital will last about 2 hours. 2.  You should bring medications for pain, anxiety, or claustrophobia if you need them. If you take medications for anxiety or claustrophobia, a ride needs to come with you. 3.  Materials for this procedure are ordered in advance and are time-sensitive. If you need to cancel or reschedule, you must call [phone] at least 48 hours prior to your appointment time. 4.  Bring recent CT scan results from other facilities with you, if they are available. Patient Instructions PRESCRIPTION REFILL POLICY [facility] Statement to Patients April 1, 2014 In an effort to ensure the large volume of patient prescription refills is processed in the most efficient and expeditious manner, we are asking our patients to assist us by calling your Pharmacy for all prescription refills, this will include also your  Mail Order Pharmacy. The pharmacy will contact our office electronically to continue the refill process. Please do not wait until the last minute to call your pharmacy. We need at least 48 hours (2days) to fill prescriptions.  We also encourage you to call your pharmacy before going to  your prescription to make sure it is ready. With regard to controlled substance prescription refill requests (narcotic refills) that need to be picked up at our office, we ask your cooperation by providing us with at least 72 hours (3days) notice that you will need a refill. We will not refill narcotic prescription refill requests after 4:00pm on any weekday, Monday through Thursday, or after 2:00pm on Fridays, or on the weekends. We encourage everyone to explore another way of getting your prescription refill request processed using [employer], our patient web portal through our electronic medical record system. [employer] is an efficient and effective way to communicate your medication request directly to the office and  downloadable as an hood on your smart phone . [employer] also features a review functionality that allows you to view your medication list as well as leave messages for your physician. Are you ready to get connected? If so please review the attatched instructions or speak to any of our staff to get you set up right away! Thank you so much for your cooperation. Should you have any questions please contact our Practice Administrator. The Physicians and Staff,  [facility] Please bring all medication bottles, including vitamins, supplements and any over-the-counter medications, to your next office visit. Introducing [facility] & HEALTH SERVICES! [employer] introduces [employer] patient portal. Now you can access parts of your medical record, email your doctor's office, and request medication refills online. 1. In your internet browser, go to [email or web address] 2. Click on the First Time User? Click Here link in the Sign In box. You will see the New Member Sign Up page. 3. Enter your [employer] Access Code exactly as it appears below.  You will not need to use this code after youve completed the sign-up process. If you do not sign up before the expiration date, you must request a new code. · Movaris Access Code: [email or web address] Expires: 7/18/2018 11:45 AM 
 
4. Enter the last four digits of your Social Security Number (xxxx) and Date of Birth (mm/dd/yyyy) as indicated and click Submit. You will be taken to the next sign-up page. 5. Create a Movaris ID. This will be your Movaris login ID and cannot be changed, so think of one that is secure and easy to remember. 6. Create a Movaris password. You can change your password at any time. 7. Enter your Password Reset Question and Answer. This can be used at a later time if you forget your password. 8. Enter your e-mail address. You will receive e-mail notification when new information is available in [address] 9. Click Sign Up. You can now view and download portions of your medical record. 10. Click the Download Summary menu link to download a portable copy of your medical information. If you have questions, please visit the Frequently Asked Questions section of the [employer] website. Remember, [employer] is NOT to be used for urgent needs. For medical emergencies, dial 911. Now available from your iPhone and Android! Please provide this summary of care documentation to your next provider. Your primary care clinician is listed as Diego Vaughn. If you have any questions after today's visit, please call [phone].

## 2018-04-19 NOTE — PATIENT INSTRUCTIONS
[facility]   Statement to Patients  April 1, 2014      In an effort to ensure the large volume of patient prescription refills is processed in the most efficient and expeditious manner, we are asking our patients to assist us by calling your Pharmacy for all prescription refills, this will include also your  Mail Order Pharmacy. The pharmacy will contact our office electronically to continue the refill process. Please do not wait until the last minute to call your pharmacy. We need at least 48 hours (2days) to fill prescriptions. We also encourage you to call your pharmacy before going to  your prescription to make sure it is ready. With regard to controlled substance prescription refill requests (narcotic refills) that need to be picked up at our office, we ask your cooperation by providing us with at least 72 hours (3days) notice that you will need a refill. We will not refill narcotic prescription refill requests after 4:00pm on any weekday, Monday through Thursday, or after 2:00pm on Fridays, or on the weekends. We encourage everyone to explore another way of getting your prescription refill request processed using [employer], our patient web portal through our electronic medical record system. [employer] is an efficient and effective way to communicate your medication request directly to the office and  downloadable as an hood on your smart phone . [employer] also features a review functionality that allows you to view your medication list as well as leave messages for your physician. Are you ready to get connected? If so please review the attatched instructions or speak to any of our staff to get you set up right away! Thank you so much for your cooperation. Should you have any questions please contact our Practice Administrator.     The Physicians and Staff,  [facility]           Please bring all medication bottles, including vitamins, supplements and any over-the-counter medications, to your next office visit.

## 2018-04-24 ENCOUNTER — HOSPITAL ENCOUNTER (OUTPATIENT)
Dept: MRI IMAGING | Age: 68
Discharge: HOME | End: 2018-04-24
Attending: FAMILY MEDICINE

## 2018-04-24 DIAGNOSIS — Z00.6 RESEARCH STUDY PATIENT: ICD-10-CM

## 2018-04-24 PROCEDURE — 70551 MRI BRAIN STEM W/O DYE: CPT

## 2018-05-01 ENCOUNTER — HOSPITAL ENCOUNTER (OUTPATIENT)
Dept: PET IMAGING | Age: 68
Discharge: HOME | End: 2018-05-01
Attending: FAMILY MEDICINE
Payer: MEDICARE

## 2018-05-01 DIAGNOSIS — Z00.6 RESEARCH EXAM: ICD-10-CM

## 2018-05-01 PROCEDURE — 78814 PET IMAGE W/CT LMTD: CPT

## 2018-05-07 ENCOUNTER — TELEPHONE (OUTPATIENT)
Dept: NEUROLOGY | Age: 68
End: 2018-05-07

## 2018-05-07 NOTE — TELEPHONE ENCOUNTER
Pt's wife stopped by the office and left a hand written note. She wanted to discuss is neg Amyloid Pet Scan. I called her cell phone number and LM on VM. A negative PET does not r/o AD, and doesn't r/o another type of dementia. I am happy to discuss with the pt in clinic. Offered f/u appt tomorrow afternoon, o/w it is going to be several weeks and we will have to find a cancellation spot. He could see the NP or Deyanira as well.    (Imaging is on my desk if she would like to  her copies.)

## 2018-05-07 NOTE — TELEPHONE ENCOUNTER
----- Message from Melva Hernández sent at 5/7/2018  2:49 PM EDT -----  Regarding: Dr Shanta Brady Officer, wife, stated she is returning call to practice.     Contact [phone]

## 2018-05-08 NOTE — TELEPHONE ENCOUNTER
I spoke with pts wife. She asked that you please hold on to everything for now. She would like to hold off on bring him in for right now.

## 2018-05-16 ENCOUNTER — TELEPHONE (OUTPATIENT)
Dept: NEUROLOGY | Age: 68
End: 2018-05-16

## 2018-05-16 NOTE — TELEPHONE ENCOUNTER
I see there are 2 PET scans in the system. The first scan appeared to be nondiagnostic for anything serious. The second scan has no interpretation resulted and I cannot determine why aside from the fact that it was a research study patient. They may have to wait until she gets back for further guidance.

## 2018-05-16 NOTE — TELEPHONE ENCOUNTER
Pt's wife calling re PET Scan. She is wondering if Dr. Lynn Maravilla has gotten a chance to look at the results and is wondering if the pt should stay on medication.  Please call back

## 2018-05-17 ENCOUNTER — TELEPHONE (OUTPATIENT)
Dept: NEUROLOGY | Age: 68
End: 2018-05-17

## 2018-05-17 NOTE — TELEPHONE ENCOUNTER
pts wife looking for your feed back on pts PET scan and medication recs. She is aware you are out of office for 2 weeks.

## 2018-05-29 NOTE — TELEPHONE ENCOUNTER
David Drummond - I will not discuss results by phone, it is a complicated conversation that is too much to cover in a phone call. I would like him to stay on the meds as ordered. Medicare will pay for additional appointments, there is not a limit of two appointments a year to see a neurologist. Some of my medicare pt's are seen every 1-2 months. I am not certain where this information came from.

## 2018-05-29 NOTE — TELEPHONE ENCOUNTER
Pts wife wishes to speak with you without her  nearby just to get your feedback on the imaging result and if there needs to be any medication changes. They will keep the October appointment, as Medicare will not cover sooner appointment. But she thanks you for the offer.

## 2018-05-29 NOTE — TELEPHONE ENCOUNTER
Janice Bess - Please see my phone note from 5/7/18. I offered a f/u the next day after pt's wife dropped by the office alone to discuss results and she declined the appt. I am happy to see pt in clinic to discuss test results. He should remain on the dementia medications and we can discuss further at f/u appt.  He has an appt in Oct, please see if I have an earlier f/u or put him on cancellation list.

## 2018-06-27 ENCOUNTER — DOCUMENTATION ONLY (OUTPATIENT)
Dept: NEUROLOGY | Age: 68
End: 2018-06-27

## 2018-06-27 NOTE — PROGRESS NOTES
Received a hand written two page letter from pt's wife. She again wants to discuss his PET scan results by phone and does not want to discuss diagnosis of dementia or results in front of her  at upcoming appt. Attempted to reach his wife at the number provided [phone], reached . 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 2) I understand that he gets upset by discussing his dementia, but I have to discuss with the pt and will try to manage his behavior. 3) I will do a MMSE as planned  4) I will leave the imaging that she dropped off previously so that she may pick it up outside of his next appt, as she requested.

## 2018-08-07 ENCOUNTER — OFFICE VISIT (OUTPATIENT)
Dept: INTERNAL MEDICINE CLINIC | Age: 68
End: 2018-08-07

## 2018-08-07 VITALS
OXYGEN SATURATION: 97 % | RESPIRATION RATE: 18 BRPM | DIASTOLIC BLOOD PRESSURE: 58 MMHG | HEART RATE: 63 BPM | TEMPERATURE: 97.2 F | HEIGHT: 64 IN | WEIGHT: 146 LBS | BODY MASS INDEX: 24.92 KG/M2 | SYSTOLIC BLOOD PRESSURE: 100 MMHG

## 2018-08-07 DIAGNOSIS — E78.00 HYPERCHOLESTEROLEMIA: ICD-10-CM

## 2018-08-07 DIAGNOSIS — F03.90 DEMENTIA WITHOUT BEHAVIORAL DISTURBANCE, UNSPECIFIED DEMENTIA TYPE: ICD-10-CM

## 2018-08-07 DIAGNOSIS — I10 BENIGN ESSENTIAL HYPERTENSION: Primary | ICD-10-CM

## 2018-08-07 DIAGNOSIS — R73.03 PRE-DIABETES: ICD-10-CM

## 2018-08-07 RX ORDER — DOXYCYCLINE 100 MG/1
100 CAPSULE ORAL DAILY
Qty: 30 CAP | Refills: 3
Start: 2018-08-07

## 2018-08-07 RX ORDER — LISINOPRIL 5 MG/1
TABLET ORAL
Qty: 90 TAB | Refills: 3 | Status: SHIPPED | OUTPATIENT
Start: 2018-08-07 | End: 2020-02-24 | Stop reason: SDUPTHER

## 2018-08-07 NOTE — MR AVS SNAPSHOT
[address] [facility], [address] [facility] 
[phone] Patient: Judge Hanks MRN: [id number] Visit Information Date & Time Provider Department Dept. Phone Encounter #  
 8/7/2018  1:45 PM MD [place] [address] [facility] [phone] [id number] Follow-up Instructions Return in about 6 months (around 2/7/2019). Your Appointments 10/18/2018  3:20 PM  
Follow Up with MD Raza Deras [facility] at [facility] [address] [place] [address]) Appt Note: 6 month f/u dementia NN 4/19/18  
 [address] [place] [address]  
[phone]  
  
   
 [address] [place] [id number] Upcoming Health Maintenance Date Due  
 GLAUCOMA SCREENING Q2Y 10/15/2016 Influenza Age 5 to Adult 8/1/2018 MEDICARE YEARLY EXAM 2/9/2019 DTaP/Tdap/Td series (2 - Td) 1/1/2021 COLONOSCOPY 11/27/2023 Allergies as of 8/7/2018  Review Complete On: 8/7/2018 By: Mendez Oliveira MD  
 No Known Allergies Current Immunizations  Reviewed on 5/4/2017 Name Date Influenza High Dose Vaccine PF 10/16/2017 Influenza Vaccine 10/1/2016, 10/20/2014, 11/5/2013 Pneumococcal Conjugate (PCV-13) 5/20/2016 Pneumococcal Polysaccharide (PPSV-23) 8/7/2017 Tdap 1/1/2011 Zoster Vaccine, Live 1/1/2000 Not reviewed this visit You Were Diagnosed With   
  
 Codes Comments Benign essential hypertension    -  Primary ICD-10-CM: I10 
ICD-9-CM: 401.1 Hypercholesterolemia     ICD-10-CM: E78.00 ICD-9-CM: 272.0 Pre-diabetes     ICD-10-CM: R73.03 
ICD-9-CM: 790.29 Dementia without behavioral disturbance, unspecified dementia type     ICD-10-CM: F03.90 ICD-9-CM: 294.20 Vitals BP Pulse Temp Resp Height(growth percentile) Weight(growth percentile)  100/58 (BP 1 Location: Left arm, BP Patient Position: Sitting) 63 97.2 °F (36.2 °C) (Oral) 18 5' 4\" (1.626 m) 146 lb (66.2 kg) SpO2 BMI Smoking Status 97% 25.06 kg/m2 Never Smoker Vitals History BMI and BSA Data Body Mass Index Body Surface Area 25.06 kg/m 2 1.73 m 2 Preferred Pharmacy Pharmacy Name Phone [address] [place] [address], [address] [place] [address] Rd. [phone] Your Updated Medication List  
  
   
This list is accurate as of 8/7/18  2:09 PM.  Always use your most recent med list.  
  
  
  
  
 aspirin delayed-release 81 mg tablet Take 81 mg by mouth every three (3) days. CARDIOTEK (BIOPERINE) PO Take  by mouth. CO Q-10 10 mg Cap Generic drug:  coenzyme q10 Take  by mouth. CURCUMIN  
by Does Not Apply route. donepezil 10 mg tablet Commonly known as:  ARICEPT Take 1 Tab by mouth nightly. doxycycline 100 mg capsule Commonly known as:  Lella Dale Take 1 Cap by mouth daily. Rosacea   Indications: ACNE ROSACEA, Every 3 days  
  
 lisinopril 5 mg tablet Commonly known as:  PRINIVIL, ZESTRIL  
TAKE ONE TABLET BY MOUTH ONCE DAILY  
  
 magnesium 250 mg Tab Take  by mouth daily. Indications: takes 3 times a week  
  
 memantine 10 mg tablet Commonly known as:  Normartina Colbyler Take 1 Tab by mouth two (2) times a day. multivitamin tablet Commonly known as:  ONE A DAY Take 1 Tab by mouth daily. pravastatin 40 mg tablet Commonly known as:  PRAVACHOL Take 1 Tab by mouth nightly. venlafaxine-SR 37.5 mg capsule Commonly known as:  EFFEXOR-XR Take 1 Cap by mouth daily. VITAMIN B-12 1,000 mcg tablet Generic drug:  cyanocobalamin Take 1,000 mcg by mouth daily. Indications: takes 3 days a week VITAMIN D3 1,000 unit tablet Generic drug:  cholecalciferol Take  by mouth daily. vitamin E 400 unit capsule Commonly known as:  [address] Take 400 Units by mouth daily. Prescriptions Sent to Pharmacy Refills lisinopril (PRINIVIL, ZESTRIL) 5 mg tablet 3 Sig: TAKE ONE TABLET BY MOUTH ONCE DAILY Class: Normal  
 [facility] #: [phone] We Performed the Following CBC WITH AUTOMATED DIFF [09037 CPT(R)] HEMOGLOBIN A1C WITH EAG [10560 CPT(R)] LIPID PANEL [84254 CPT(R)] METABOLIC PANEL, COMPREHENSIVE [05121 CPT(R)] TSH REFLEX TO T4 [10062 CPT(R)] Follow-up Instructions Return in about 6 months (around 2/7/2019). Patient Instructions Omron BP machine for arm (plug in) Introducing [facility] & [facility]! [employer] introduces [employer] patient portal. Now you can access parts of your medical record, email your doctor's office, and request medication refills online. 1. In your internet browser, go to [email or web address] 2. Click on the First Time User? Click Here link in the Sign In box. You will see the New Member Sign Up page. 3. Enter your [employer] Access Code exactly as it appears below. You will not need to use this code after youve completed the sign-up process. If you do not sign up before the expiration date, you must request a new code. · [employer] Access Code: [email or web address] Expires: 11/5/2018  2:09 PM 
 
4. Enter the last four digits of your Social Security Number (xxxx) and Date of Birth (mm/dd/yyyy) as indicated and click Submit. You will be taken to the next sign-up page. 5. Create a TapCrowd ID. This will be your TapC[employer]d login ID and cannot be changed, so think of one that is secure and easy to remember. 6. Create a TapC[employer]d password. You can change your password at any time. 7. Enter your Password Reset Question and Answer. This can be used at a later time if you forget your password. 8. Enter your e-mail address. You will receive e-mail notification when new information is available in [address] 9. Click Sign Up. You can now view and download portions of your medical record. 10. Click the Download Summary menu link to download a portable copy of your medical information. If you have questions, please visit the Frequently Asked Questions section of the [employer] website. Remember, [employer] is NOT to be used for urgent needs. For medical emergencies, dial 911. Now available from your iPhone and Android! Please provide this summary of care documentation to your next provider. Your primary care clinician is listed as Stephon Peralta. If you have any questions after today's visit, please call [phone].

## 2018-08-07 NOTE — PROGRESS NOTES
Dementia; Hypertension; and Cholesterol Problem       HPI:  Netta Case is a 70y.o. year old male who is here for a follow up visit. He was last seen by me on 2/8/2018. He reports the following:    No dizziness. On 10 mg of lisinopril. Going to gym now three times a week. BP is low today. Doesn't monitor at home    Vision is good. Will see eye doctor    Discussed DNR. Pt defers to wife and she would like the forms to consider it. I don't think he fully understands what it means. Tolerating aricept and namenda. No falls. Mostly watches TV. Wife now has him color pictures for grandchildren. Struggles recalling their names but he eventually does. No constipation. Weight stable. Wt Readings from Last 3 Encounters:   08/07/18 146 lb (66.2 kg)   04/19/18 149 lb (67.6 kg)   02/08/18 151 lb 6.4 oz (68.7 kg)            Assessment and Plan        1. Benign essential hypertension  Ok to reduce lisinopril to 5 mg and let us know readings. May be able to wean off it. No hx of CAD or CVA  - TSH REFLEX TO T4  - CBC WITH AUTOMATED DIFF  - METABOLIC PANEL, COMPREHENSIVE    2. Hypercholesterolemia  Recheck cholesterol. pravachol 40 mg. We changed to more of a statin that doesn't cross blood brain barrier  - LIPID PANEL    3. Pre-diabetes  Recheck a1c. Pt exercising now  - HEMOGLOBIN A1C WITH EAG    4. Dementia without behavioral disturbance, unspecified dementia type  Discussed DNR and she will consider it. They do have an AD. Continue present management. No changes made to regimen. Keep challenging him to do new tasks  No naps over 20 minutes  Keep exercising.                 Visit Vitals    /58 (BP 1 Location: Left arm, BP Patient Position: Sitting)    Pulse 63    Temp 97.2 °F (36.2 °C) (Oral)    Resp 18    Ht 5' 4\" (1.626 m)    Wt 146 lb (66.2 kg)    SpO2 97%    BMI 25.06 kg/m2       Historical Data    Past Medical History:   Diagnosis Date    Altered glucose metabolism  Hypercholesterolemia     Hypertension     Rosacea        Past Surgical History:   Procedure Laterality Date    CHEST SURGERY PROCEDURE UNLISTED      rib fracturewith hematoma    HX APPENDECTOMY      HX COLONOSCOPY  2013    10 years    HX ORTHOPAEDIC      heel injury requiring skin graft    HX TONSILLECTOMY         Outpatient Encounter Prescriptions as of 8/7/2018   Medication Sig Dispense Refill    lisinopril (PRINIVIL, ZESTRIL) 5 mg tablet TAKE ONE TABLET BY MOUTH ONCE DAILY 90 Tab 3    doxycycline (MONODOX) 100 mg capsule Take 1 Cap by mouth daily. Rosacea   Indications: ACNE ROSACEA, Every 3 days 30 Cap 3    donepezil (ARICEPT) 10 mg tablet Take 1 Tab by mouth nightly. 30 Tab 11    memantine (NAMENDA) 10 mg tablet Take 1 Tab by mouth two (2) times a day. 60 Tab 11    coenzyme q10 (CO Q-10) 10 mg cap Take  by mouth.  cholecalciferol (VITAMIN D3) 1,000 unit tablet Take  by mouth daily.  vitamin E (AQUA GEMS) 400 unit capsule Take 400 Units by mouth daily.  venlafaxine-SR (EFFEXOR-XR) 37.5 mg capsule Take 1 Cap by mouth daily. 90 Cap 3    pravastatin (PRAVACHOL) 40 mg tablet Take 1 Tab by mouth nightly. 90 Tab 3    cyanocobalamin (VITAMIN B-12) 1,000 mcg tablet Take 1,000 mcg by mouth daily. Indications: takes 3 days a week      VIT C/E/B6/FA/B12/ARGIN/PEP XT (CARDIOTEK, BIOPERINE, PO) Take  by mouth.  magnesium 250 mg tab Take  by mouth daily. Indications: takes 3 times a week      multivitamin (ONE A DAY) tablet Take 1 Tab by mouth daily.  aspirin delayed-release 81 mg tablet Take 81 mg by mouth every three (3) days.  [DISCONTINUED] lisinopril (PRINIVIL, ZESTRIL) 10 mg tablet TAKE ONE TABLET BY MOUTH ONCE DAILY 90 Tab 3    TURMERIC (CURCUMIN) by Does Not Apply route.  [DISCONTINUED] doxycycline (MONODOX) 100 mg capsule Take 100 mg by mouth.  Rosacea   Indications: INHALED ANTHRAX, Every 3 days       No facility-administered encounter medications on file as of 8/7/2018. No Known Allergies     Social History     Social History    Marital status:      Spouse name: N/A    Number of children: N/A    Years of education: N/A     Occupational History   [employer] family [employer] Retired     Social History Main Topics    Smoking status: Never Smoker    Smokeless tobacco: Never Used    Alcohol use No    Drug use: No    Sexual activity: Not on file     Other Topics Concern    Not on file     Social History Narrative    , retired . family history includes Arthritis-osteo in his mother; Heart Disease (age of onset: 62) in his paternal grandfather; Heart Disease (age of onset: 58) in his father. Review of Systems   Constitutional: Negative for chills, diaphoresis, fever, malaise/fatigue and weight loss. HENT: Negative for hearing loss. Respiratory: Negative for cough. Cardiovascular: Negative for chest pain. Gastrointestinal: Negative for blood in stool and constipation. Genitourinary: Negative for dysuria, flank pain, frequency and urgency. Musculoskeletal: Negative for myalgias. Skin: Negative for rash. Neurological: Negative for dizziness, weakness and headaches. Endo/Heme/Allergies: Does not bruise/bleed easily. Physical Exam   Constitutional: He appears well-developed and well-nourished. He is active. Non-toxic appearance. He does not have a sickly appearance. He does not appear ill. No distress. Eyes: Conjunctivae are normal. Right eye exhibits no discharge. Cardiovascular: Normal rate, regular rhythm, S1 normal, S2 normal, normal heart sounds and normal pulses. Exam reveals no gallop and no friction rub. Pulmonary/Chest: Effort normal and breath sounds normal. No respiratory distress. Abdominal: Soft. Bowel sounds are normal.   Musculoskeletal: He exhibits no edema or deformity. Neurological: He is alert. Coordination normal.   Skin: Skin is warm and dry. No rash noted. No pallor. Psychiatric: He has a normal mood and affect. His behavior is normal. Cognition and memory are impaired. He exhibits abnormal recent memory and abnormal remote memory. Vitals reviewed. Ortho Exam      Orders Placed This Encounter    TSH REFLEX TO T4    LIPID PANEL    CBC WITH AUTOMATED DIFF    METABOLIC PANEL, COMPREHENSIVE    HEMOGLOBIN A1C WITH EAG    lisinopril (PRINIVIL, ZESTRIL) 5 mg tablet     Sig: TAKE ONE TABLET BY MOUTH ONCE DAILY     Dispense:  90 Tab     Refill:  3    doxycycline (MONODOX) 100 mg capsule     Sig: Take 1 Cap by mouth daily. Rosacea   Indications: ACNE ROSACEA, Every 3 days     Dispense:  30 Cap     Refill:  3        I have reviewed the patient's medical history in detail and updated the computerized patient record. We had a prolonged discussion about these complex clinical issues and went over the various important aspects to consider. All questions were answered. Advised him to call back or return to office if symptoms do not improve, change in nature, or persist.    He was given an after visit summary or informed of [facility] which includes patient instructions, diagnoses, current medications, & vitals. He expressed understanding with the diagnosis and plan.

## 2018-08-07 NOTE — PROGRESS NOTES
Reviewed record in preparation for visit and have obtained necessary documentation. Identified pt with two pt identifiers(name and ).     Chief Complaint   Patient presents with    Complete Physical       Health Maintenance Due   Topic Date Due    Glaucoma Screening   10/15/2016    Flu Vaccine  2018       Coordination of Care Questionnaire:  :     1) Have you been to an emergency room, urgent care clinic since your last visit? no   Hospitalized since your last visit? no             2) Have you seen or consulted any other health care providers outside of [address] [place] [address] since your last visit? no  (Include any pap smears or colon screenings in this section.)

## 2018-08-09 ENCOUNTER — HOSPITAL ENCOUNTER (OUTPATIENT)
Dept: LAB | Age: 68
Discharge: HOME | End: 2018-08-09
Payer: MEDICARE

## 2018-08-09 PROCEDURE — 83036 HEMOGLOBIN GLYCOSYLATED A1C: CPT

## 2018-08-09 PROCEDURE — 85025 COMPLETE CBC W/AUTO DIFF WBC: CPT

## 2018-08-09 PROCEDURE — 80061 LIPID PANEL: CPT

## 2018-08-09 PROCEDURE — 84443 ASSAY THYROID STIM HORMONE: CPT

## 2018-08-09 PROCEDURE — 36415 COLL VENOUS BLD VENIPUNCTURE: CPT

## 2018-08-09 PROCEDURE — 80053 COMPREHEN METABOLIC PANEL: CPT

## 2018-08-10 LAB
ALBUMIN SERPL-MCNC: 3.9 G/DL (ref 3.6–4.8)
ALBUMIN/GLOB SERPL: 1.8 {RATIO} (ref 1.2–2.2)
ALP SERPL-CCNC: 62 IU/L (ref 39–117)
ALT SERPL-CCNC: 13 IU/L (ref 0–44)
AST SERPL-CCNC: 17 IU/L (ref 0–40)
BASOPHILS # BLD AUTO: 0 X10E3/UL (ref 0–0.2)
BASOPHILS NFR BLD AUTO: 0 %
BILIRUB SERPL-MCNC: 0.4 MG/DL (ref 0–1.2)
BUN SERPL-MCNC: 18 MG/DL (ref 8–27)
BUN/CREAT SERPL: 20 (ref 10–24)
CALCIUM SERPL-MCNC: 9.6 MG/DL (ref 8.6–10.2)
CHLORIDE SERPL-SCNC: 102 MMOL/L (ref 96–106)
CHOLEST SERPL-MCNC: 156 MG/DL (ref 100–199)
CO2 SERPL-SCNC: 27 MMOL/L (ref 20–29)
CREAT SERPL-MCNC: 0.9 MG/DL (ref 0.76–1.27)
EOSINOPHIL # BLD AUTO: 0.2 X10E3/UL (ref 0–0.4)
EOSINOPHIL NFR BLD AUTO: 3 %
ERYTHROCYTE [DISTWIDTH] IN BLOOD BY AUTOMATED COUNT: 13.6 % (ref 12.3–15.4)
EST. AVERAGE GLUCOSE BLD GHB EST-MCNC: 131 MG/DL
GLOBULIN SER CALC-MCNC: 2.2 G/DL (ref 1.5–4.5)
GLUCOSE SERPL-MCNC: 99 MG/DL (ref 65–99)
HBA1C MFR BLD: 6.2 % (ref 4.8–5.6)
HCT VFR BLD AUTO: 45.9 % (ref 37.5–51)
HDLC SERPL-MCNC: 54 MG/DL
HGB BLD-MCNC: 14.4 G/DL (ref 13–17.7)
IMM GRANULOCYTES # BLD: 0 X10E3/UL (ref 0–0.1)
IMM GRANULOCYTES NFR BLD: 0 %
INTERPRETATION, 910389: NORMAL
LDLC SERPL CALC-MCNC: 79 MG/DL (ref 0–99)
LYMPHOCYTES # BLD AUTO: 1.6 X10E3/UL (ref 0.7–3.1)
LYMPHOCYTES NFR BLD AUTO: 23 %
MCH RBC QN AUTO: 28.8 PG (ref 26.6–33)
MCHC RBC AUTO-ENTMCNC: 31.4 G/DL (ref 31.5–35.7)
MCV RBC AUTO: 92 FL (ref 79–97)
MONOCYTES # BLD AUTO: 0.6 X10E3/UL (ref 0.1–0.9)
MONOCYTES NFR BLD AUTO: 8 %
NEUTROPHILS # BLD AUTO: 4.4 X10E3/UL (ref 1.4–7)
NEUTROPHILS NFR BLD AUTO: 66 %
PLATELET # BLD AUTO: 294 X10E3/UL (ref 150–379)
POTASSIUM SERPL-SCNC: 4.8 MMOL/L (ref 3.5–5.2)
PROT SERPL-MCNC: 6.1 G/DL (ref 6–8.5)
RBC # BLD AUTO: 5 X10E6/UL (ref 4.14–5.8)
SODIUM SERPL-SCNC: 142 MMOL/L (ref 134–144)
TRIGL SERPL-MCNC: 116 MG/DL (ref 0–149)
TSH SERPL DL<=0.005 MIU/L-ACNC: 2.35 UIU/ML (ref 0.45–4.5)
VLDLC SERPL CALC-MCNC: 23 MG/DL (ref 5–40)
WBC # BLD AUTO: 6.8 X10E3/UL (ref 3.4–10.8)

## 2018-09-30 DIAGNOSIS — E78.00 HYPERCHOLESTEROLEMIA: ICD-10-CM

## 2018-09-30 RX ORDER — PRAVASTATIN SODIUM 40 MG/1
TABLET ORAL
Qty: 90 TAB | Refills: 3 | Status: SHIPPED | OUTPATIENT
Start: 2018-09-30 | End: 2019-09-26 | Stop reason: SDUPTHER

## 2018-10-18 ENCOUNTER — OFFICE VISIT (OUTPATIENT)
Dept: NEUROLOGY | Age: 68
End: 2018-10-18

## 2018-10-18 VITALS
SYSTOLIC BLOOD PRESSURE: 118 MMHG | HEIGHT: 64 IN | WEIGHT: 143.4 LBS | RESPIRATION RATE: 18 BRPM | OXYGEN SATURATION: 95 % | DIASTOLIC BLOOD PRESSURE: 62 MMHG | BODY MASS INDEX: 24.48 KG/M2 | HEART RATE: 58 BPM | TEMPERATURE: 97.8 F

## 2018-10-18 DIAGNOSIS — F03.90 DEMENTIA WITHOUT BEHAVIORAL DISTURBANCE, UNSPECIFIED DEMENTIA TYPE: Primary | ICD-10-CM

## 2018-10-18 NOTE — PROGRESS NOTES
Chief Complaint   Patient presents with    Memory Loss     follow up       1. Have you been to the ER, urgent care clinic since your last visit? Hospitalized since your last visit? no    2. Have you seen or consulted any other health care providers outside of the [address] [place] [address] since your last visit? Include any pap smears or colon screening.  No      Visit Vitals  /62 (BP 1 Location: Right arm, BP Patient Position: Sitting)   Pulse (!) 58   Temp 97.8 °F (36.6 °C) (Oral)   Resp 18   Ht 5' 4\" (1.626 m)   Wt 65 kg (143 lb 6.4 oz)   SpO2 95%   BMI 24.61 kg/m²

## 2018-10-18 NOTE — PROGRESS NOTES
Neurology Consult Note      HISTORY PROVIDED BY: patient and wife    Chief Complaint:   Chief Complaint   Patient presents with    Memory Loss     follow up      Subjective:   Pt is a 70 y.o. left handed male last seen in clinic on 4/19/18 in f/u for dementia, possible FTD vs AD, with progressive memory loss first noticed around 2013, becoming very noticeable in 2015 with Neuropsychological testing by Dr. Genesis Cohen in March, 2015 with data supporting a memory issue as well as for other cognitive issues including fluency and naming. 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 MRI brain w/wo contrast 6/19/15 with left temporal atrophy with asymmetric lateral ventricle on the left. Pt has anxiety and depression, but no other significant change in personality, compulsive behaviors, disinhibition, or apathy reported by his wife. Exam was non-focal, MMSE score 10/19/17 was stable, 23/30. Continued Effexor XR 37.5 for mood. Started Aricept 10mg daily and Memantine 10mg bid in a couple of weeks when Namzaric runs out. We discussed financial issues at length, but having a rational discussion has been difficult due to his dementia and paranoia related to this. He returns for f/u. He reports he is doing well. He remembers the things that are important. His wife feels he is declining. Since last visit, he was seen at [facility] and underwent amyloid PET scan which was negative and therefore he was not eligible for the study. This has caused great confusion. The pt took this to mean he does not have dementia. His wife received the PET report that stated, \"No plaques and tangles\" and is perplexed about why this wasn't a more detailed report. She has also been to AD support groups and has questions related to information obtained from other caregivers. She asks about continuing aricept and namenda.  She also has an article about \"PART\" Primary age related Tauopathy and was hoping that I could look at the PET scan and determine if he has \"tangles, but no plaques. \" She mentions seeing PET scan results that have various colors on them. She also has an advanced directive for me to sign. Past Medical History:   Diagnosis Date    Altered glucose metabolism     Hypercholesterolemia     Hypertension     Rosacea       Past Surgical History:   Procedure Laterality Date    CHEST SURGERY PROCEDURE UNLISTED      rib fracturewith hematoma    HX APPENDECTOMY      HX COLONOSCOPY  2013    10 years    HX ORTHOPAEDIC      heel injury requiring skin graft    HX TONSILLECTOMY        Social History     Socioeconomic History    Marital status:      Spouse name: Not on file    Number of children: Not on file    Years of education: Not on file    Highest education level: Not on file   Social Needs    Financial resource strain: Not on file    Food insecurity - worry: Not on file    Food insecurity - inability: Not on file   [employer] needs - medical: Not on file   [employer] needs - non-medical: Not on file   Occupational History    Occupation: , 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        Family History   Problem Relation Age of Onset    Arthritis-osteo Mother     Heart Disease Father 58        acute MI    Heart Disease Paternal Grandfather 62         Objective:   Review of Systems : Per HPI, o/w neg      No Known Allergies     Meds:  Outpatient Medications Prior to Visit   Medication Sig Dispense Refill    pravastatin (PRAVACHOL) 40 mg tablet TAKE ONE TABLET BY MOUTH NIGHTLY 90 Tab 3    lisinopril (PRINIVIL, ZESTRIL) 5 mg tablet TAKE ONE TABLET BY MOUTH ONCE DAILY 90 Tab 3    doxycycline (MONODOX) 100 mg capsule Take 1 Cap by mouth daily. Rosacea   Indications: ACNE ROSACEA, Every 3 days 30 Cap 3    donepezil (ARICEPT) 10 mg tablet Take 1 Tab by mouth nightly. 30 Tab 11    memantine (NAMENDA) 10 mg tablet Take 1 Tab by mouth two (2) times a day. 60 Tab 11    coenzyme q10 (CO Q-10) 10 mg cap Take  by mouth.  cholecalciferol (VITAMIN D3) 1,000 unit tablet Take  by mouth daily.  vitamin E (AQUA GEMS) 400 unit capsule Take 400 Units by mouth daily.  venlafaxine-SR (EFFEXOR-XR) 37.5 mg capsule Take 1 Cap by mouth daily. 90 Cap 3    cyanocobalamin (VITAMIN B-12) 1,000 mcg tablet Take 1,000 mcg by mouth daily. Indications: takes 3 days a week      magnesium 250 mg tab Take  by mouth daily. Indications: takes 3 times a week      multivitamin (ONE A DAY) tablet Take 1 Tab by mouth daily.  aspirin delayed-release 81 mg tablet Take 81 mg by mouth every three (3) days.  TURMERIC (CURCUMIN) by Does Not Apply route.  VIT C/E/B6/FA/B12/ARGIN/PEP XT (CARDIOTEK, BIOPERINE, PO) Take  by mouth. No facility-administered medications prior to visit. Imaging:  MRI Results (most recent):  Results from Hospital Encounter encounter on 04/24/18   MRI BRAIN RESEARCH WO CONT    Narrative INDICATION:  screening study for research     COMPARISON:  None    TECHNIQUE:  MR imaging of the brain was performed per Alzheimer's research study  including the following sequences: axial FLAIR, GRE, noncontrast coronal MPRAGE  T1.    FINDINGS:      The ventricles are midline without hydrocephalus. There is no acute intra or  extra-axial fluid collection. There is mild periventricular T2 signal  hyperintensity in the supratentorial brain as well as a few scattered punctate  foci of T2 hyperintensity in the subcortical white matter of both frontal and  parietal lobes. There is no evidence of previous microhemorrhage.  There is no  evidence of previous macro hemorrhage. There is no evidence of superficial  siderosis. Thus, no evidence of ARIA-H  There are no areas of sulcal effusion. There is no parenchymal vasogenic edema. Thus no evidence of ARIA-E The major  intracranial vascular flow-voids are patent. 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 No evidence of ARIA-H or  ARIA-E.        CT Results (most recent):  No results found for this or any previous visit. Reviewed records in [facility] tab today    Lab Review   Results for orders placed or performed in visit on 08/07/18   TSH REFLEX TO T4   Result Value Ref Range    TSH 2.350 0.450 - 4.500 uIU/mL   LIPID PANEL   Result Value Ref Range    Cholesterol, total 156 100 - 199 mg/dL    Triglyceride 116 0 - 149 mg/dL    HDL Cholesterol 54 >39 mg/dL    VLDL, calculated 23 5 - 40 mg/dL    LDL, calculated 79 0 - 99 mg/dL   CBC WITH AUTOMATED DIFF   Result Value Ref Range    WBC 6.8 3.4 - 10.8 x10E3/uL    RBC 5.00 4.14 - 5.80 x10E6/uL    HGB 14.4 13.0 - 17.7 g/dL    HCT 45.9 37.5 - 51.0 %    MCV 92 79 - 97 fL    MCH 28.8 26.6 - 33.0 pg    MCHC 31.4 (L) 31.5 - 35.7 g/dL    RDW 13.6 12.3 - 15.4 %    PLATELET 337 927 - 247 x10E3/uL    NEUTROPHILS 66 Not Estab. %    Lymphocytes 23 Not Estab. %    MONOCYTES 8 Not Estab. %    EOSINOPHILS 3 Not Estab. %    BASOPHILS 0 Not Estab. %    ABS. NEUTROPHILS 4.4 1.4 - 7.0 x10E3/uL    Abs Lymphocytes 1.6 0.7 - 3.1 x10E3/uL    ABS. MONOCYTES 0.6 0.1 - 0.9 x10E3/uL    ABS. EOSINOPHILS 0.2 0.0 - 0.4 x10E3/uL    ABS. BASOPHILS 0.0 0.0 - 0.2 x10E3/uL    IMMATURE GRANULOCYTES 0 Not Estab. %    ABS. IMM.  GRANS. 0.0 0.0 - 0.1 A29X3/QO   METABOLIC PANEL, COMPREHENSIVE   Result Value Ref Range    Glucose 99 65 - 99 mg/dL    BUN 18 8 - 27 mg/dL    Creatinine 0.90 0.76 - 1.27 mg/dL    GFR est non-AA 87 >59 mL/min/1.73    GFR est  >59 mL/min/1.73    BUN/Creatinine ratio 20 10 - 24    Sodium 142 134 - 144 mmol/L    Potassium 4.8 3.5 - 5.2 mmol/L    Chloride 102 96 - 106 mmol/L    CO2 27 20 - 29 mmol/L    Calcium 9.6 8.6 - 10.2 mg/dL    Protein, total 6.1 6.0 - 8.5 g/dL    Albumin 3.9 3.6 - 4.8 g/dL    GLOBULIN, TOTAL 2.2 1.5 - 4.5 g/dL    A-G Ratio 1.8 1.2 - 2.2    Bilirubin, total 0.4 0.0 - 1.2 mg/dL    Alk. phosphatase 62 39 - 117 IU/L    AST (SGOT) 17 0 - 40 IU/L    ALT (SGPT) 13 0 - 44 IU/L   HEMOGLOBIN A1C WITH EAG   Result Value Ref Range    Hemoglobin A1c 6.2 (H) 4.8 - 5.6 %    Estimated average glucose 131 mg/dL   CVD REPORT   Result Value Ref Range    INTERPRETATION Note         Exam:  Visit Vitals  /62 (BP 1 Location: Right arm, BP Patient Position: Sitting)   Pulse (!) 58   Temp 97.8 °F (36.6 °C) (Oral)   Resp 18   Ht 5' 4\" (1.626 m)   Wt 65 kg (143 lb 6.4 oz)   SpO2 95%   BMI 24.61 kg/m²     General:  Alert, cooperative, no distress. Head:  Normocephalic, without obvious abnormality, atraumatic. Respiratory:  Heart:   Non-labored breathing  RRR, no murmurs   Neck:      Extremities:    Pulses: 2+ radial pulses       Neurologic:  MS: Alert, speech intact. Language-see MMSE. Attention and fund of knowledge appropriate. Memory impaired.    Cranial Nerves:  II: visual fields    II: pupils    II: optic disc    III,VII: ptosis none   III,IV,VI: extraocular muscles  EOMI, no nystagmus    V: facial light touch sensation     VII: facial muscle function   symmetric   VIII: hearing intact   IX: soft palate elevation     XI: trapezius strength     XI: sternocleidomastoid strength    XII: tongue       Motor:   Sensory:   Coordination:   Gait:   Reflexes:     Mini Mental State Exam 10/18/2018 10/19/2017 4/13/2017 10/13/2016   What is the Year 1 0 1 1   What is the Season 0 1 1 0   What is the Date 0 1 0 0   What is the Day 1 1 1 1   What is the Month 0 1 1 1   Where are we State 1 1 1 1   Where are we Country 0 1 1 1   Where are we [place]  or City 1 1 1 1   Where are we Floor 1 1 1 1   Name three objects, then ask the patient to say them 3 3 3 3   Serial sevens Subtract 7 from 100 in increments 0 4 4 5   Ask for the three objects repeated above 0 1 0 0   Name a pencil 0 0 1 1   Name a watch 0 0 0 0   Have the patient repeat this phrase \"No ifs, ands, or buts\" 0 0 1 1   Three stage command: Take the paper in your right hand 1 1 1 1   Fold the paper in half 1 1 1 1   Put the paper on the floor 1 1 1 1   Read and obey the following: CLOSE YOUR EYES 1 1 1 1   Have the patient write a sentence 1 1 0 1   Have the patient copy a figure 0 1 1 1   Mini Mental Score 13 23 23 24          Assessment/Plan   Pt is a 70 y.o. left handed male with dementia, possible FTD vs AD, with negative amyloid PET scan through the [address], with progressive memory loss first noticed around 2013, becoming very noticeable in 2015 with Neuropsychological testing by Dr. Genesis Cohen in March, 2015 with data supporting a memory issue as well as for other cognitive issues including fluency and naming. 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 MRI brain w/wo contrast 6/19/15 with left temporal atrophy with asymmetric lateral ventricle on the left. Pt has anxiety and depression, but no other significant change in personality, compulsive behaviors, disinhibition, or apathy reported by his wife. Exam is non-focal, MMSE score 13/30, significantly declined from 1 year ago. Continue Effexor XR 37.5 for mood. Continue Aricept 10mg daily and Memantine 10mg bid. We discussed the Amyloid PET scan and limitations of the data obtained, as well as the difference between this type of PET scan and other types. She is very interested in determining the exact type of dementia he has and whether or not he qualifies for any research studies. Recommend evaluation in the [facility] dementia clinic. F/u made for 6 months, but may change pending outcome at [facility]. ICD-10-CM ICD-9-CM    1.  Dementia without behavioral disturbance, unspecified dementia type F03.90 294.20 REFERRAL TO NEUROLOGY       Signed:   Yaneth Self MD  10/18/2018

## 2018-11-01 RX ORDER — VENLAFAXINE HYDROCHLORIDE 37.5 MG/1
CAPSULE, EXTENDED RELEASE ORAL
Qty: 30 CAP | Refills: 11 | Status: SHIPPED | OUTPATIENT
Start: 2018-11-01 | End: 2019-11-30 | Stop reason: SDUPTHER

## 2019-02-05 ENCOUNTER — HOSPITAL ENCOUNTER (OUTPATIENT)
Dept: LAB | Age: 69
Discharge: HOME | End: 2019-02-05
Payer: MEDICARE

## 2019-02-05 PROCEDURE — 85025 COMPLETE CBC W/AUTO DIFF WBC: CPT

## 2019-02-05 PROCEDURE — 80053 COMPREHEN METABOLIC PANEL: CPT

## 2019-02-05 PROCEDURE — 80061 LIPID PANEL: CPT

## 2019-02-05 PROCEDURE — 84443 ASSAY THYROID STIM HORMONE: CPT

## 2019-02-05 PROCEDURE — 83036 HEMOGLOBIN GLYCOSYLATED A1C: CPT

## 2019-02-05 PROCEDURE — 36415 COLL VENOUS BLD VENIPUNCTURE: CPT

## 2019-02-07 ENCOUNTER — TELEPHONE (OUTPATIENT)
Dept: INTERNAL MEDICINE CLINIC | Age: 69
End: 2019-02-07

## 2019-02-07 NOTE — TELEPHONE ENCOUNTER
----- Message from Horacio Thornton MD sent at 2/7/2019 11:40 AM EST -----  Let wife know his tests looked great and his sugar improved.   His cholesterol was also excellent

## 2019-02-07 NOTE — TELEPHONE ENCOUNTER
Attempted to contact patient's wife Amado Sanchez to give her  lab results. Left message to return a call.

## 2019-03-01 ENCOUNTER — OFFICE VISIT (OUTPATIENT)
Dept: INTERNAL MEDICINE CLINIC | Age: 69
End: 2019-03-01

## 2019-03-01 VITALS
HEART RATE: 56 BPM | HEIGHT: 64 IN | SYSTOLIC BLOOD PRESSURE: 110 MMHG | OXYGEN SATURATION: 96 % | WEIGHT: 145.8 LBS | DIASTOLIC BLOOD PRESSURE: 70 MMHG | RESPIRATION RATE: 16 BRPM | BODY MASS INDEX: 24.89 KG/M2 | TEMPERATURE: 97.4 F

## 2019-03-01 DIAGNOSIS — E78.00 HYPERCHOLESTEROLEMIA: ICD-10-CM

## 2019-03-01 DIAGNOSIS — R73.03 PRE-DIABETES: ICD-10-CM

## 2019-03-01 DIAGNOSIS — F03.90 DEMENTIA WITHOUT BEHAVIORAL DISTURBANCE, UNSPECIFIED DEMENTIA TYPE: Primary | ICD-10-CM

## 2019-03-01 DIAGNOSIS — I10 BENIGN ESSENTIAL HYPERTENSION: ICD-10-CM

## 2019-03-01 RX ORDER — MEMANTINE HYDROCHLORIDE 10 MG/1
10 TABLET ORAL 2 TIMES DAILY
Qty: 180 TAB | Refills: 4 | Status: SHIPPED | OUTPATIENT
Start: 2019-03-01 | End: 2020-02-24 | Stop reason: SDUPTHER

## 2019-03-01 RX ORDER — DONEPEZIL HYDROCHLORIDE 10 MG/1
10 TABLET, FILM COATED ORAL
Qty: 90 TAB | Refills: 4 | Status: SHIPPED | OUTPATIENT
Start: 2019-03-01 | End: 2020-02-24 | Stop reason: SDUPTHER

## 2019-03-01 NOTE — PATIENT INSTRUCTIONS
Telephone on 02/01/2019 Component Date Value Ref Range Status  WBC 02/05/2019 6.6  3.4 - 10.8 x10E3/uL Final  
 RBC 02/05/2019 4.96  4.14 - 5.80 x10E6/uL Final  
 HGB 02/05/2019 14.6  13.0 - 17.7 g/dL Final  
 HCT 02/05/2019 44.9  37.5 - 51.0 % Final  
 MCV 02/05/2019 91  79 - 97 fL Final  
 MCH 02/05/2019 29.4  26.6 - 33.0 pg Final  
 MCHC 02/05/2019 32.5  31.5 - 35.7 g/dL Final  
 RDW 02/05/2019 13.3  12.3 - 15.4 % Final  
 PLATELET 96/98/4670 078  150 - 379 x10E3/uL Final  
 NEUTROPHILS 02/05/2019 65  Not Estab. % Final  
 Lymphocytes 02/05/2019 22  Not Estab. % Final  
 MONOCYTES 02/05/2019 10  Not Estab. % Final  
 EOSINOPHILS 02/05/2019 2  Not Estab. % Final  
 BASOPHILS 02/05/2019 1  Not Estab. % Final  
 ABS. NEUTROPHILS 02/05/2019 4.3  1.4 - 7.0 x10E3/uL Final  
 Abs Lymphocytes 02/05/2019 1.5  0.7 - 3.1 x10E3/uL Final  
 ABS. MONOCYTES 02/05/2019 0.6  0.1 - 0.9 x10E3/uL Final  
 ABS. EOSINOPHILS 02/05/2019 0.1  0.0 - 0.4 x10E3/uL Final  
 ABS. BASOPHILS 02/05/2019 0.0  0.0 - 0.2 x10E3/uL Final  
 IMMATURE GRANULOCYTES 02/05/2019 0  Not Estab. % Final  
 ABS. IMM.  GRANS. 02/05/2019 0.0  0.0 - 0.1 x10E3/uL Final  
 Cholesterol, total 02/05/2019 148  100 - 199 mg/dL Final  
 Triglyceride 02/05/2019 124  0 - 149 mg/dL Final  
 HDL Cholesterol 02/05/2019 53  >39 mg/dL Final  
 VLDL, calculated 02/05/2019 25  5 - 40 mg/dL Final  
 LDL, calculated 02/05/2019 70  0 - 99 mg/dL Final  
 Glucose 02/05/2019 107* 65 - 99 mg/dL Final  
 BUN 02/05/2019 14  8 - 27 mg/dL Final  
 Creatinine 02/05/2019 0.95  0.76 - 1.27 mg/dL Final  
 GFR est non-AA 02/05/2019 81  >59 mL/min/1.73 Final  
 GFR est AA 02/05/2019 93  >59 mL/min/1.73 Final  
 BUN/Creatinine ratio 02/05/2019 15  10 - 24 Final  
 Sodium 02/05/2019 142  134 - 144 mmol/L Final  
 Potassium 02/05/2019 4.7  3.5 - 5.2 mmol/L Final  
 Chloride 02/05/2019 101  96 - 106 mmol/L Final  
 CO2 02/05/2019 25  20 - 29 mmol/L Final  
  Calcium 02/05/2019 9.4  8.6 - 10.2 mg/dL Final  
 Protein, total 02/05/2019 6.4  6.0 - 8.5 g/dL Final  
 Albumin 02/05/2019 4.0  3.5 - 4.8 g/dL Final  
 GLOBULIN, TOTAL 02/05/2019 2.4  1.5 - 4.5 g/dL Final  
 A-G Ratio 02/05/2019 1.7  1.2 - 2.2 Final  
 Bilirubin, total 02/05/2019 0.5  0.0 - 1.2 mg/dL Final  
 Alk. phosphatase 02/05/2019 66  39 - 117 IU/L Final  
 AST (SGOT) 02/05/2019 18  0 - 40 IU/L Final  
 ALT (SGPT) 02/05/2019 13  0 - 44 IU/L Final  
 TSH 02/05/2019 2.670  0.450 - 4.500 uIU/mL Final  
 Hemoglobin A1c 02/05/2019 5.9* 4.8 - 5.6 % Final  
 Comment:          Prediabetes: 5.7 - 6.4 Diabetes: >6.4 Glycemic control for adults with diabetes: <7.0  Estimated average glucose 02/05/2019 123  mg/dL Final  
 INTERPRETATION 02/05/2019 Note   Final  
 Supplemental report is available.

## 2019-03-01 NOTE — PROGRESS NOTES
Chief Complaint Patient presents with [facility] Visit Reviewed record in preparation for visit and have obtained necessary documentation. Identified pt with two pt identifiers(name and ). Health Maintenance Due Topic  Shingrix Vaccine Age 50> (1 of 2)  GLAUCOMA SCREENING Q2Y [address] [address] EXAM   
 
 
 
Chief Complaint Patient presents with [facility] Visit Wt Readings from Last 3 Encounters:  
19 145 lb 12.8 oz (66.1 kg) 10/18/18 143 lb 6.4 oz (65 kg) 18 146 lb (66.2 kg) Temp Readings from Last 3 Encounters:  
19 97.4 °F (36.3 °C) (Oral) 10/18/18 97.8 °F (36.6 °C) (Oral) 18 97.2 °F (36.2 °C) (Oral) BP Readings from Last 3 Encounters:  
19 110/70  
10/18/18 118/62  
18 100/58 Pulse Readings from Last 3 Encounters:  
19 (!) 56  
10/18/18 (!) 58  
18 63 Learning Assessment: 
:  
 
Learning Assessment 2018 PRIMARY LEARNER Patient Patient Patient HIGHEST LEVEL OF EDUCATION - PRIMARY LEARNER  > 4 YEARS OF COLLEGE - > 4 YEARS OF COLLEGE  
BARRIERS PRIMARY LEARNER NONE - NONE  
CO-LEARNER CAREGIVER - - No  
PRIMARY LANGUAGE ENGLISH ENGLISH ENGLISH  
LEARNER PREFERENCE PRIMARY READING READING OTHER (COMMENT) - DEMONSTRATION -  
ANSWERED BY patient patient patient RELATIONSHIP SELF SELF SELF Depression Screening: 
:  
 
3 most recent PHQ Screens 3/1/2019 Little interest or pleasure in doing things Not at all Feeling down, depressed, irritable, or hopeless Not at all Total Score PHQ 2 0 Fall Risk Assessment: 
:  
 
Fall Risk Assessment, last 12 mths 3/1/2019 Able to walk? Yes Fall in past 12 months? No  
 
 
Abuse Screening: 
:  
 
Abuse Screening Questionnaire 2018 Do you ever feel afraid of your partner? Osito Schuler Are you in a relationship with someone who physically or mentally threatens you?  Osito Schuler  
 Is it safe for you to go home? [place] Coordination of Care Questionnaire: 
:  
 
1) Have you been to an emergency room, urgent care clinic since your last visit? no  
Hospitalized since your last visit? no          
 
2) Have you seen or consulted any other health care providers outside of [address] [place] [address] since your last visit? no  (Include any pap smears or colon screenings in this section.) 3) Do you have an Advance Directive on file? no 
 
4) Are you interested in receiving information on Advance Directives? NO Patient is accompanied by spouse I have received verbal consent from Susana Becerril to discuss any/all medical information while they are present in the room.

## 2019-03-01 NOTE — PROGRESS NOTES
Follow Up Chronic Condition HPI: 
Gladis Kidd is a 78y.o. year old male who is here for a follow up visit. He was last seen by me on Visit date not found. He reports the following: 
 
Pt doing well. Wife says he sleeps too much and is not exercising. Upset I am leaving. No worsening of memory Pt not talkative Discussed improvement of blood sugars and possible start metformin for prediabetes Assessment and Plan 1. Dementia without behavioral disturbance, unspecified dementia type Stable Take turmeric Encourage exercise New stimulation Sleep less during day 2. Benign essential hypertension Tolerating medication. Denies dizziness that is positional, SOB, or chest pain. Understands the importance of compliance to reduce risk of future heart failure. Agreed to call if any of above symptoms develop and  stay on current regimen of   
Key CAD CHF Meds   
    
  
 pravastatin (PRAVACHOL) 40 mg tablet  (Taking) TAKE ONE TABLET BY MOUTH NIGHTLY  
 lisinopril (PRINIVIL, ZESTRIL) 5 mg tablet  (Taking) TAKE ONE TABLET BY MOUTH ONCE DAILY  
 aspirin delayed-release 81 mg tablet  (Taking) Take 81 mg by mouth every three (3) days. 3. Pre-diabetes Suggest metformin to reduce sugar in body causing inflammation. Diet changes. Increase exercise. a1c is 5.9 now 4. Hypercholesterolemia The nature of cardiac risk has been fully discussed with this patient. I have made him aware of his LDL target goal given his cardiovascular risk analysis. I have discussed the appropriate diet. The need for lifelong compliance in order to reduce risk is stressed. A regular exercise program is recommended to help achieve and maintain normal body weight, fitness and improve lipid balance. The risks and benefits of medications were discussed. Last cholesterol labs reviewed with patient. Patient made aware to get liver checked every 6 months. Continue with Key Antihyperlipidemia Meds   
    
  
 pravastatin (PRAVACHOL) 40 mg tablet  (Taking) TAKE ONE TABLET BY MOUTH NIGHTLY Visit Vitals /70 (BP 1 Location: Left arm, BP Patient Position: Sitting) Pulse (!) 56 Temp 97.4 °F (36.3 °C) (Oral) Resp 16 Ht 5' 4\" (1.626 m) Wt 145 lb 12.8 oz (66.1 kg) SpO2 96% BMI 25.03 kg/m² Historical Data Past Medical History:  
Diagnosis Date  Altered glucose metabolism  Hypercholesterolemia  Hypertension  Rosacea Past Surgical History:  
Procedure Laterality Date  CHEST SURGERY PROCEDURE UNLISTED    
 rib fracturewith hematoma  HX APPENDECTOMY  HX COLONOSCOPY  2013  
 10 years  HX ORTHOPAEDIC    
 heel injury requiring skin graft  HX TONSILLECTOMY Outpatient Encounter Medications as of 3/1/2019 Medication Sig Dispense Refill  donepezil (ARICEPT) 10 mg tablet Take 1 Tab by mouth nightly. 90 Tab 4  
 memantine (NAMENDA) 10 mg tablet Take 1 Tab by mouth two (2) times a day. 180 Tab 4  
 venlafaxine-SR (EFFEXOR-XR) 37.5 mg capsule TAKE ONE CAPSULE BY MOUTH ONCE DAILY 30 Cap 11  
 pravastatin (PRAVACHOL) 40 mg tablet TAKE ONE TABLET BY MOUTH NIGHTLY 90 Tab 3  
 lisinopril (PRINIVIL, ZESTRIL) 5 mg tablet TAKE ONE TABLET BY MOUTH ONCE DAILY 90 Tab 3  
 doxycycline (MONODOX) 100 mg capsule Take 1 Cap by mouth daily. Rosacea   Indications: ACNE ROSACEA, Every 3 days 30 Cap 3  coenzyme q10 (CO Q-10) 10 mg cap Take  by mouth.  cholecalciferol (VITAMIN D3) 1,000 unit tablet Take  by mouth daily.  vitamin E (AQUA GEMS) 400 unit capsule Take 400 Units by mouth daily.  cyanocobalamin (VITAMIN B-12) 1,000 mcg tablet Take 1,000 mcg by mouth daily. Indications: takes 3 days a week  VIT C/E/B6/FA/B12/ARGIN/PEP XT (CARDIOTEK, BIOPERINE, PO) Take  by mouth.  magnesium 250 mg tab Take  by mouth daily. Indications: takes 3 times a week  multivitamin (ONE A DAY) tablet Take 1 Tab by mouth daily.  aspirin delayed-release 81 mg tablet Take 81 mg by mouth every three (3) days.  [DISCONTINUED] donepezil (ARICEPT) 10 mg tablet Take 1 Tab by mouth nightly. 30 Tab 11  
 [DISCONTINUED] memantine (NAMENDA) 10 mg tablet Take 1 Tab by mouth two (2) times a day. 60 Tab 11  TURMERIC (CURCUMIN) by Does Not Apply route. No facility-administered encounter medications on file as of 3/1/2019. 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  
 Drug use: No  
 Sexual activity: Not on file Other Topics Concern  Not on file Social History Narrative , retired . family history includes Arthritis-osteo in his mother; Heart Disease (age of onset: 62) in his paternal grandfather; Heart Disease (age of onset: 58) in his father. Review of Systems Constitutional: Negative for chills, diaphoresis, fever, malaise/fatigue and weight loss. HENT: Negative for hearing loss. Respiratory: Negative for cough. Cardiovascular: Negative for chest pain. Gastrointestinal: Negative for blood in stool and constipation. Genitourinary: Negative for dysuria, flank pain, frequency and urgency. Musculoskeletal: Negative for myalgias. Skin: Negative for rash. Neurological: Negative for dizziness, weakness and headaches. Endo/Heme/Allergies: Does not bruise/bleed easily. Physical Exam  
Constitutional: He appears well-developed and well-nourished. He is active. Non-toxic appearance. He does not have a sickly appearance. He does not appear ill. No distress. Eyes: Conjunctivae are normal. Right eye exhibits no discharge. Neck: Carotid bruit is not present. No thyroid mass and no thyromegaly present. Cardiovascular: Normal rate, regular rhythm, S1 normal, S2 normal, normal heart sounds and normal pulses. Exam reveals no gallop and no friction rub. Pulmonary/Chest: Effort normal and breath sounds normal. No respiratory distress. Abdominal: Soft. Bowel sounds are normal.  
Musculoskeletal: He exhibits no edema or deformity. Neurological: He is alert. No cranial nerve deficit. Coordination normal.  
Skin: Skin is warm and dry. No rash noted. No pallor. Psychiatric: He has a normal mood and affect. His speech is normal and behavior is normal. Thought content normal. Cognition and memory are impaired. He exhibits abnormal recent memory. Vitals reviewed. Ortho Exam 
 
 
Orders Placed This Encounter  donepezil (ARICEPT) 10 mg tablet Sig: Take 1 Tab by mouth nightly. Dispense:  90 Tab Refill:  4  
 memantine (NAMENDA) 10 mg tablet Sig: Take 1 Tab by mouth two (2) times a day. Dispense:  180 Tab Refill:  4 I have reviewed the patient's medical history in detail and updated the computerized patient record. We had a prolonged discussion about these complex clinical issues and went over the various important aspects to consider. All questions were answered. Advised him to call back or return to office if symptoms do not improve, change in nature, or persist. 
 
He was given an after visit summary or informed of [facility] which includes patient instructions, diagnoses, current medications, & vitals. He expressed understanding with the diagnosis and plan.

## 2019-04-25 ENCOUNTER — OFFICE VISIT (OUTPATIENT)
Dept: NEUROLOGY | Age: 69
End: 2019-04-25

## 2019-04-25 VITALS
SYSTOLIC BLOOD PRESSURE: 102 MMHG | HEIGHT: 64 IN | HEART RATE: 76 BPM | RESPIRATION RATE: 16 BRPM | WEIGHT: 148 LBS | DIASTOLIC BLOOD PRESSURE: 54 MMHG | BODY MASS INDEX: 25.27 KG/M2 | OXYGEN SATURATION: 96 %

## 2019-04-25 DIAGNOSIS — F03.90 DEMENTIA WITHOUT BEHAVIORAL DISTURBANCE, UNSPECIFIED DEMENTIA TYPE: Primary | ICD-10-CM

## 2019-04-25 DIAGNOSIS — R45.4 IRRITABILITY AND ANGER: ICD-10-CM

## 2019-04-25 NOTE — PROGRESS NOTES
Christy Rivera. is a 78 y.o. male    Chief Complaint   Patient presents with    Memory Loss     follow up. Per patient and spouse feel that memory has gotten worse. Patient didn't go to [facility] because they never received a call from [facility] and patient decided he didn't want to go. 1. Have you been to the ER, urgent care clinic since your last visit? Hospitalized since your last visit? No    2. Have you seen or consulted any other health care providers outside of the [address] [place] [address] since your last visit? Include any pap smears or colon screening.  No     Health Maintenance Due   Topic Date Due    Shingrix Vaccine Age 49> (1 of 2) 01/11/1999    GLAUCOMA SCREENING Q2Y  10/15/2016    MEDICARE YEARLY EXAM  02/09/2019     Visit Vitals  /54 (BP 1 Location: Left arm, BP Patient Position: Sitting)   Pulse 76   Resp 16   Ht 5' 4\" (1.626 m)   Wt 67.1 kg (148 lb)   SpO2 96%   BMI 25.40 kg/m²

## 2019-04-25 NOTE — PROGRESS NOTES
Neurology Consult Note      HISTORY PROVIDED BY: patient and wife    Chief Complaint:   Chief Complaint   Patient presents with    Memory Loss     follow up. Subjective:   Pt is a 70 y.o. left handed male last seen in clinic 10/18/18 in f/u for dementia, possible FTD vs AD, with negative amyloid PET scan through the [address], with progressive memory loss first noticed around 2013, becoming very noticeable in 2015 with Neuropsychological testing by Dr. Jessie Chadwick in March, 2015 with data supporting a memory issue as well as for other cognitive issues including fluency and naming. 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 MRI brain w/wo contrast 6/19/15 with left temporal atrophy with asymmetric lateral ventricle on the left. Pt has anxiety and depression, but no other significant change in personality, compulsive behaviors, disinhibition, or apathy reported by his wife. Exam was non-focal, MMSE score 13/30, significantly declined from 1 year ago. Continued Effexor XR 37.5 for mood. Continued Aricept 10mg daily and Memantine 10mg bid. We discussed the Amyloid PET scan and limitations of the data obtained, as well as the difference between this type of PET scan and other types. She is very interested in determining the exact type of dementia he has and whether or not he qualifies for any research studies. Recommended evaluation in the [facility] dementia clinic. He returns for f/u. Pt has been diagnosed with Chron's disease by Dr. Parisa Maldonado since the last visit, on sulfasalazine. He is doing better from a GI standpoint. When asked about his memory, he states, \"what memory. \"  They have not been to [facility], they never heard from anyone about an appt. They are no longer interested in going, they feel like things have settled down and they are in a good routine. He is doing well from a mood standpoint on Effexor XR. No new complaints.         Past Medical History:   Diagnosis Date    Altered glucose metabolism     Crohn's disease ([facility]     Hypercholesterolemia     Hypertension     Rosacea       Past Surgical History:   Procedure Laterality Date    CHEST SURGERY PROCEDURE UNLISTED      rib fracturewith hematoma    HX APPENDECTOMY      HX COLONOSCOPY  2013    10 years    HX ORTHOPAEDIC      heel injury requiring skin graft    HX TONSILLECTOMY        Social History     Socioeconomic History    Marital status:      Spouse name: Not on file    Number of children: Not on file    Years of education: Not on file    Highest education level: Not on file   Occupational History    Occupation: , 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   Social Needs    Financial resource strain: Not on file    Food insecurity:     Worry: Not on file     Inability: Not on file    Transportation needs:     Medical: Not on file     Non-medical: Not on file   Tobacco Use    Smoking status: Never Smoker    Smokeless tobacco: Never Used   Substance and Sexual Activity    Alcohol use: No    Drug use: No    Sexual activity: Not on file   Lifestyle    Physical activity:     Days per week: Not on file     Minutes per session: Not on file    Stress: Not on file   Relationships    Social connections:     Talks on phone: Not on file     Gets together: Not on file     Attends Worship service: Not on file     Active member of club or organization: Not on file     Attends meetings of clubs or organizations: Not on file     Relationship status: Not on file    Intimate partner violence:     Fear of current or ex partner: Not on file     Emotionally abused: Not on file     Physically abused: Not on file     Forced sexual activity: Not on file   Other Topics Concern    Not on file   Social History Narrative    , retired .        Family History   Problem Relation Age of Onset   [facility] Arthritis-osteo Mother     Heart Disease Father 58        acute MI    Heart Disease Paternal Grandfather 62         Objective:   Review of Systems : Per HPI, o/w neg      No Known Allergies     Meds:  Outpatient Medications Prior to Visit   Medication Sig Dispense Refill    donepezil (ARICEPT) 10 mg tablet Take 1 Tab by mouth nightly. 90 Tab 4    memantine (NAMENDA) 10 mg tablet Take 1 Tab by mouth two (2) times a day. 180 Tab 4    venlafaxine-SR (EFFEXOR-XR) 37.5 mg capsule TAKE ONE CAPSULE BY MOUTH ONCE DAILY 30 Cap 11    pravastatin (PRAVACHOL) 40 mg tablet TAKE ONE TABLET BY MOUTH NIGHTLY 90 Tab 3    lisinopril (PRINIVIL, ZESTRIL) 5 mg tablet TAKE ONE TABLET BY MOUTH ONCE DAILY 90 Tab 3    doxycycline (MONODOX) 100 mg capsule Take 1 Cap by mouth daily. Rosacea   Indications: ACNE ROSACEA, Every 3 days 30 Cap 3    coenzyme q10 (CO Q-10) 10 mg cap Take  by mouth.  cholecalciferol (VITAMIN D3) 1,000 unit tablet Take  by mouth daily.  vitamin E (AQUA GEMS) 400 unit capsule Take 400 Units by mouth daily.  cyanocobalamin (VITAMIN B-12) 1,000 mcg tablet Take 1,000 mcg by mouth daily. Indications: takes 3 days a week      TURMERIC (CURCUMIN) by Does Not Apply route.  magnesium 250 mg tab Take  by mouth daily. Indications: takes 3 times a week      multivitamin (ONE A DAY) tablet Take 1 Tab by mouth daily.  aspirin delayed-release 81 mg tablet Take 81 mg by mouth every three (3) days.  VIT C/E/B6/FA/B12/ARGIN/PEP XT (CARDIOTEK, BIOPERINE, PO) Take  by mouth. No facility-administered medications prior to visit. Imaging:  MRI Results (most recent):  Results from Hospital Encounter encounter on 04/24/18   MRI BRAIN RESEARCH WO CONT    Narrative INDICATION:  screening study for research     COMPARISON:  None    TECHNIQUE:  MR imaging of the brain was performed per Alzheimer's research study  including the following sequences: axial FLAIR, GRE, noncontrast coronal MPRAGE  T1.    FINDINGS:      The ventricles are midline without hydrocephalus. There is no acute intra or  extra-axial fluid collection. There is mild periventricular T2 signal  hyperintensity in the supratentorial brain as well as a few scattered punctate  foci of T2 hyperintensity in the subcortical white matter of both frontal and  parietal lobes. There is no evidence of previous microhemorrhage. There is no  evidence of previous macro hemorrhage. There is no evidence of superficial  siderosis. Thus, no evidence of ARIA-H  There are no areas of sulcal effusion. There is no parenchymal vasogenic edema. Thus no evidence of ARIA-E The major  intracranial vascular flow-voids are patent. 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 No evidence of ARIA-H or  ARIA-E.        CT Results (most recent):  No results found for this or any previous visit. Reviewed records in [facility] today    Lab Review   Results for orders placed or performed in visit on 02/01/19   CBC WITH AUTOMATED DIFF   Result Value Ref Range    WBC 6.6 3.4 - 10.8 x10E3/uL    RBC 4.96 4.14 - 5.80 x10E6/uL    HGB 14.6 13.0 - 17.7 g/dL    HCT 44.9 37.5 - 51.0 %    MCV 91 79 - 97 fL    MCH 29.4 26.6 - 33.0 pg    MCHC 32.5 31.5 - 35.7 g/dL    RDW 13.3 12.3 - 15.4 %    PLATELET 312 797 - 202 x10E3/uL    NEUTROPHILS 65 Not Estab. %    Lymphocytes 22 Not Estab. %    MONOCYTES 10 Not Estab. %    EOSINOPHILS 2 Not Estab. %    BASOPHILS 1 Not Estab. %    ABS. NEUTROPHILS 4.3 1.4 - 7.0 x10E3/uL    Abs Lymphocytes 1.5 0.7 - 3.1 x10E3/uL    ABS. MONOCYTES 0.6 0.1 - 0.9 x10E3/uL    ABS. EOSINOPHILS 0.1 0.0 - 0.4 x10E3/uL    ABS. BASOPHILS 0.0 0.0 - 0.2 x10E3/uL    IMMATURE GRANULOCYTES 0 Not Estab. %    ABS. IMM.  GRANS. 0.0 0.0 - 0.1 x10E3/uL   LIPID PANEL   Result Value Ref Range    Cholesterol, total 148 100 - 199 mg/dL    Triglyceride 124 0 - 149 mg/dL    HDL Cholesterol 53 >39 mg/dL    VLDL, calculated 25 5 - 40 mg/dL    LDL, calculated 70 0 - 99 mg/dL   METABOLIC PANEL, COMPREHENSIVE   Result Value Ref Range    Glucose 107 (H) 65 - 99 mg/dL    BUN 14 8 - 27 mg/dL    Creatinine 0.95 0.76 - 1.27 mg/dL    GFR est non-AA 81 >59 mL/min/1.73    GFR est AA 93 >59 mL/min/1.73    BUN/Creatinine ratio 15 10 - 24    Sodium 142 134 - 144 mmol/L    Potassium 4.7 3.5 - 5.2 mmol/L    Chloride 101 96 - 106 mmol/L    CO2 25 20 - 29 mmol/L    Calcium 9.4 8.6 - 10.2 mg/dL    Protein, total 6.4 6.0 - 8.5 g/dL    Albumin 4.0 3.5 - 4.8 g/dL    GLOBULIN, TOTAL 2.4 1.5 - 4.5 g/dL    A-G Ratio 1.7 1.2 - 2.2    Bilirubin, total 0.5 0.0 - 1.2 mg/dL    Alk. phosphatase 66 39 - 117 IU/L    AST (SGOT) 18 0 - 40 IU/L    ALT (SGPT) 13 0 - 44 IU/L   TSH REFLEX TO T4   Result Value Ref Range    TSH 2.670 0.450 - 4.500 uIU/mL   HEMOGLOBIN A1C WITH EAG   Result Value Ref Range    Hemoglobin A1c 5.9 (H) 4.8 - 5.6 %    Estimated average glucose 123 mg/dL   CVD REPORT   Result Value Ref Range    INTERPRETATION Note         Exam:  Visit Vitals  /54 (BP 1 Location: Left arm, BP Patient Position: Sitting)   Pulse 76   Resp 16   Ht 5' 4\" (1.626 m)   Wt 67.1 kg (148 lb)   SpO2 96%   BMI 25.40 kg/m²     General:  Alert, cooperative, no distress. Head:  Normocephalic, without obvious abnormality, atraumatic. Respiratory:  Heart:   Non-labored breathing  RRR, no murmurs   Neck:      Extremities:    Pulses: 2+ radial pulses       Neurologic:  MS: Alert, speech intact. Language-see MMSE. Attention and fund of knowledge appropriate. Memory impaired.    Cranial Nerves:  II: visual fields    II: pupils    II: optic disc    III,VII: ptosis none   III,IV,VI: extraocular muscles  EOMI, no nystagmus    V: facial light touch sensation     VII: facial muscle function   symmetric   VIII: hearing intact   IX: soft palate elevation     XI: trapezius strength     XI: sternocleidomastoid strength    XII: tongue       Motor:   Sensory:   Coordination:   Gait:   Reflexes:     Mini Mental State Exam 4/25/2019 10/18/2018 10/19/2017 4/13/2017 10/13/2016   What is the Year 0 1 0 1 1   What is the Season 0 0 1 1 0   What is the Date 0 0 1 0 0   What is the Day 1 1 1 1 1   What is the Month 0 0 1 1 1   Where are we State 1 1 1 1 1   Where are we Country 0 0 1 1 1   Where are we [facility] or [facility] 1 1 1 1 1   Where are we Floor 0 1 1 1 1   Name three objects, then ask the patient to say them 3 3 3 3 3   Serial sevens Subtract 7 from 100 in increments 0 0 4 4 5   Ask for the three objects repeated above 1 0 1 0 0   Name a pencil 0 0 0 1 1   Name a watch 0 0 0 0 0   Have the patient repeat this phrase \"No ifs, ands, or buts\" 1 0 0 1 1   Three stage command: Take the paper in your right hand 1 1 1 1 1   Fold the paper in half 1 1 1 1 1   Put the paper on the floor 1 1 1 1 1   Read and obey the following: CLOSE YOUR EYES 1 1 1 1 1   Have the patient write a sentence 1 1 1 0 1   Have the patient copy a figure 1 0 1 1 1   Mini Mental Score 14 13 23 23 24          Assessment/Plan   Pt is a 70 y.o. left handed male with dementia, possible FTD vs AD, with negative amyloid PET scan through the [address], with progressive memory loss first noticed around 2013, becoming very noticeable in 2015 with Neuropsychological testing by Dr. Neo Hobbs in March, 2015 with data supporting a memory issue as well as for other cognitive issues including fluency and naming. 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 MRI brain w/wo contrast 6/19/15 with left temporal atrophy with asymmetric lateral ventricle on the left. Pt has anxiety and depression, but no other significant change in personality, compulsive behaviors, disinhibition, or apathy reported by his wife. Mood improved on Effexor XR 37.5mg daily. Exam is non-focal, MMSE score 14/30, stable over last 6 months.   -Continue Effexor XR 37.5 for mood.    -Continue Aricept 10mg daily and Memantine 10mg bid.   -F/u in 6 months, instructed to call in the interim if needed. ICD-10-CM ICD-9-CM    1. Dementia without behavioral disturbance, unspecified dementia type F03.90 294.20    2. Irritability and anger R45.4 799.22        Signed:   Christian Alfonso MD  4/25/2019

## 2019-07-12 ENCOUNTER — TELEPHONE (OUTPATIENT)
Dept: NEUROLOGY | Age: 69
End: 2019-07-12

## 2019-07-12 NOTE — TELEPHONE ENCOUNTER
Pt's wife calling re letter the pt's employer needed. She dropped off a self addressed/stamped envelope 3 weeks ago.  Please call back

## 2019-07-17 ENCOUNTER — TELEPHONE (OUTPATIENT)
Dept: NEUROLOGY | Age: 69
End: 2019-07-17

## 2019-07-17 NOTE — TELEPHONE ENCOUNTER
Ms. Alvarado Mako dictated letter to me to print and have Dr. Kieran Lomas sign. This has been done. Will mail to [employer] at [address]. [address] [place] [phone].

## 2019-07-17 NOTE — TELEPHONE ENCOUNTER
I spoke with patient's spouse. She dictated letter to me. I typed it and placed it in Amrik Carolina's folder for signature.

## 2019-08-08 ENCOUNTER — OFFICE VISIT (OUTPATIENT)
Dept: PRIMARY CARE CLINIC | Age: 69
End: 2019-08-08

## 2019-08-08 VITALS
OXYGEN SATURATION: 96 % | WEIGHT: 146.2 LBS | RESPIRATION RATE: 16 BRPM | HEART RATE: 57 BPM | SYSTOLIC BLOOD PRESSURE: 111 MMHG | TEMPERATURE: 98.1 F | BODY MASS INDEX: 24.96 KG/M2 | HEIGHT: 64 IN | DIASTOLIC BLOOD PRESSURE: 64 MMHG

## 2019-08-08 DIAGNOSIS — I10 ESSENTIAL HYPERTENSION: ICD-10-CM

## 2019-08-08 DIAGNOSIS — K50.80 CROHN'S DISEASE OF BOTH SMALL AND LARGE INTESTINE WITHOUT COMPLICATION (HCC): ICD-10-CM

## 2019-08-08 DIAGNOSIS — Z23 NEED FOR SHINGLES VACCINE: ICD-10-CM

## 2019-08-08 DIAGNOSIS — F02.80 LATE ONSET ALZHEIMER'S DISEASE WITHOUT BEHAVIORAL DISTURBANCE (HCC): ICD-10-CM

## 2019-08-08 DIAGNOSIS — F41.9 ANXIETY: ICD-10-CM

## 2019-08-08 DIAGNOSIS — Z71.89 ACP (ADVANCE CARE PLANNING): ICD-10-CM

## 2019-08-08 DIAGNOSIS — L71.9 ROSACEA: ICD-10-CM

## 2019-08-08 DIAGNOSIS — G30.1 LATE ONSET ALZHEIMER'S DISEASE WITHOUT BEHAVIORAL DISTURBANCE (HCC): ICD-10-CM

## 2019-08-08 DIAGNOSIS — Z00.00 MEDICARE ANNUAL WELLNESS VISIT, SUBSEQUENT: Primary | ICD-10-CM

## 2019-08-08 DIAGNOSIS — Z11.1 SCREENING-PULMONARY TB: ICD-10-CM

## 2019-08-08 DIAGNOSIS — E78.2 MIXED HYPERLIPIDEMIA: ICD-10-CM

## 2019-08-08 DIAGNOSIS — R73.02 IGT (IMPAIRED GLUCOSE TOLERANCE): ICD-10-CM

## 2019-08-08 RX ORDER — SULFASALAZINE 500 MG/1
500 TABLET ORAL 4 TIMES DAILY
COMMUNITY
End: 2020-08-04

## 2019-08-08 NOTE — PROGRESS NOTES
Donna Rachel is a 78 y.o. male and presents for Annual Medicare Wellness Visit. Assessment of cognitive impairment: Alert and oriented x 3. Depression Screen:   3 most recent PHQ Screens 8/8/2019   Little interest or pleasure in doing things Not at all   Feeling down, depressed, irritable, or hopeless Not at all   Total Score PHQ 2 0       Fall Risk Assessment:    Fall Risk Assessment, last 12 mths 8/8/2019   Able to walk? Yes   Fall in past 12 months? No       Abuse Screen:   Abuse Screening Questionnaire 8/8/2019   Do you ever feel afraid of your partner? N   Are you in a relationship with someone who physically or mentally threatens you? N   Is it safe for you to go home? Y       Activities of Daily Living  Requires assistance with: yes, wife helps with bathing , grooming & dressing. Can`t drive by himself.   Patient handle his/her own medications  No- patient states wife helps  Use of pill box  Patient states he's not sure, his wife helps   Activities of Daily Living:   ADL Assessment 8/8/2019   Feeding yourself No Help Needed   Getting from bed to chair No Help Needed   Getting dressed No Help Needed   Bathing or showering No Help Needed   Walk across the room (includes cane/walker) No Help Needed   Using the telphone No Help Needed   Taking your medications No Help Needed   Preparing meals No Help Needed   Managing money (expenses/bills) No Help Needed   Moderately strenuous housework (laundry) No Help Needed   Shopping for personal items (toiletries/medicines) No Help Needed   Shopping for groceries No Help Needed   Driving Help Needed   Climbing a flight of stairs No Help Needed   Getting to places beyond walking distances Help Needed       Health Maintenance:  Daily Aspirin: yes  Bone Density: N/A  Glaucoma Screening:  he has an appointment scheduled   Immunizations:    Tetanus: up to date 01/01/2011  Influenza: up to date 10/14/2018  Shingles: up to date 01/01/2000  PPSV-23: up to date 08/17/2017. Prevnar-13: up to date. 05/20/2016    Cancer screening:    Cervical: N/A. Breast: N/A. Colon: up to date done in 03/2019. Prostate:  PSA- 11/15/2017- Value- 1.8    Alcohol Risk Screen:   On any occasion during the past 3 months, have you had more than 3 drinks(female) or 4 drinks (male) containing alcohol in one? No  Do you average more than 7 drinks (female) or 14 drinks (male) per week? No  Type and amount:Denies     Hearing Loss:  Hearing is good. denies any hearing loss    Vision Loss:   Wears glasses, contact lenses, or have any other visual impairment  yes    Adult Nutrition Screen:  No risk factors noted. Advance Care Planning:   End of Life Planning: Patient has an Advanced directive & copy has been provided. Naty Hammond ACP-Facilitator appointment no      Medications/Allergies: Reviewed with patient  Prior to Admission medications    Medication Sig Start Date End Date Taking? Authorizing Provider   donepezil (ARICEPT) 10 mg tablet Take 1 Tab by mouth nightly. 3/1/19  Yes Katie Knox MD   memantine [facility]) 10 mg tablet Take 1 Tab by mouth two (2) times a day. 3/1/19  Yes Katie Knox MD   venlafaxine-SR [facility] 37.5 mg capsule TAKE ONE CAPSULE BY MOUTH ONCE DAILY 11/1/18  Yes Obie Cid MD   pravastatin (PRAVACHOL) 40 mg tablet TAKE ONE TABLET BY MOUTH NIGHTLY 9/30/18  Yes Katie Knox MD   lisinopril (PRINIVIL, ZESTRIL) 5 mg tablet TAKE ONE TABLET BY MOUTH ONCE DAILY 8/7/18  Yes Katie Knox MD   doxycycline (MONODOX) 100 mg capsule Take 1 Cap by mouth daily. Rosacea   Indications: ACNE ROSACEA, Every 3 days 8/7/18  Yes Katie Knox MD   coenzyme q10 (CO Q-10) 10 mg cap Take  by mouth. Yes Provider, Historical   cholecalciferol (VITAMIN D3) 1,000 unit tablet Take  by mouth daily. Yes Provider, Historical   vitamin E (AQUA GEMS) 400 unit capsule Take 400 Units by mouth daily.    Yes Provider, Historical   cyanocobalamin (VITAMIN B-12) 1,000 mcg tablet Take 1,000 mcg by mouth daily. Indications: takes 3 days a week   Yes Provider, Historical   magnesium 250 mg tab Take  by mouth daily. Indications: takes 3 times a week   Yes Provider, Historical   multivitamin (ONE A DAY) tablet Take 1 Tab by mouth daily. Yes Provider, Historical   aspirin delayed-release 81 mg tablet Take 81 mg by mouth every three (3) days. Yes Provider, Historical   TURMERIC (CURCUMIN) by Does Not Apply route. Provider, Historical   VIT C/E/B6/FA/B12/ARGIN/PEP XT (CARDIOTEK, BIOPERINE, PO) Take  by mouth. Provider, Historical       No Known Allergies    Objective:  Visit Vitals  /64 (BP 1 Location: Left arm, BP Patient Position: Sitting)   Pulse (!) 57   Temp 98.1 °F (36.7 °C) (Oral)   Resp 16   Ht 5' 4\" (1.626 m)   Wt 146 lb 3.2 oz (66.3 kg)   SpO2 96%   BMI 25.10 kg/m²    Body mass index is 25.1 kg/m². Problem List: Reviewed with patient and discussed risk factors.     Patient Active Problem List   Diagnosis Code    Benign essential hypertension I10    Hypercholesterolemia E78.00    Pre-diabetes R73.03    H/O colonoscopy Z98.890    Memory loss R41.3    Dementia without behavioral disturbance F03.90       PSH: Reviewed with patient  Past Surgical History:   Procedure Laterality Date    CHEST SURGERY PROCEDURE UNLISTED      rib fracturewith hematoma    HX APPENDECTOMY      HX COLONOSCOPY  2013    10 years    HX ORTHOPAEDIC      heel injury requiring skin graft    HX TONSILLECTOMY          SH: Reviewed with patient  Social History     Tobacco Use    Smoking status: Never Smoker    Smokeless tobacco: Never Used   Substance Use Topics    Alcohol use: No    Drug use: No       FH: Reviewed with patient  Family History   Problem Relation Age of Onset   Enzo Carry Arthritis-osteo Mother     Heart Disease Father 58        acute MI    Heart Disease Paternal Grandfather 62       Current medical providers:    Patient Care Team:  William Haley MD as PCP - General (Internal Medicine)  Jolly Paz MD as Consulting Provider (Gastroenterology)  Margot Umanzor MD ( Neurology)    Plan:    Diagnoses and all orders for this visit:    Medicare annual wellness visit, subsequent  Immunization & health screening discussed with his wife as patient has dementia. -     PSA SCREENING (SCREENING)    ACP (advance care planning)  Patient has an Advanced directive & copy has been provided. Need for shingles vaccine  -     varicella-zoster recombinant, PF, (SHINGRIX, PF,) 50 mcg/0.5 mL susr injection; 0.5 mL by IntraMUSCular route once for 1 dose., Normal, Disp-0.5 mL, R-0    Health Maintenance   Topic Date Due    Shingrix Vaccine Age 49> (1 of 2) 1999    GLAUCOMA SCREENING Q2Y  10/15/2016    Influenza Age 9 to Adult  2019    MEDICARE YEARLY EXAM  2020    DTaP/Tdap/Td series (2 - Td) 2021    COLONOSCOPY  2023    Hepatitis C Screening  Completed    Pneumococcal 65+ years  Completed          Urinary/ Fecal Incontinence: Denies Either     Regular physical exercise: Wife  states that he does a lot of walking    Patient verbalized understanding of information presented. AVS and Medicare Part B Preventive Services Table printed and given to pt and reviewed. See table for findings under Recommendation and Scheduled. All of the patient's questions were answered. Progress Note    Name: Lorie Mckeon BRACMJ Date: 2019  Ethnicity: NON-  Race: OTHER  MRN: [id number]  Age: 78 y.o.  : 1950  Sex: Male       HPI:   Lorie Mckeon is a 78y.o. year old male who is new to our practice. He was seeing Dr. Marco Antonio Del Angel & now his wife wants him to get establish in our practice. He Is accompanied by his wife who is the primary care giver. Patient has dementia & is under Neurology care. His memory has been getting progressively worse & wife is considering to put him in a Day care from morning till evening.  He does not have any behavioral issues but she has to be around him all the time. His short term memory is poor but recognizes his wife. Needs paper work completed for Day care & TB screening test as well. He has a H/o Crohn`s disease for which he follows GI and is under control on Sulfasalazine. .  Visit Vitals  /64 (BP 1 Location: Left arm, BP Patient Position: Sitting)   Pulse (!) 57   Temp 98.1 °F (36.7 °C) (Oral)   Resp 16   Ht 5' 4\" (1.626 m)   Wt 146 lb 3.2 oz (66.3 kg)   SpO2 96%   BMI 25.10 kg/m²     Review of Systems   Unable  to obtained due to dementia. Physical Examination     General:  Alert,  no distress, appears stated age. Head:  Normocephalic, without obvious abnormality, atraumatic. Eyes:  Conjunctivae/corneas clear. PERRL, EOMs intact. Ears:  Normal TMs and external ear canals both ears. Nose:  Mucosa normal. No drainage or sinus tenderness. Throat: Lips, mucosa, and tongue normal. Teeth and gums normal.   Neck: Supple, symmetrical, trachea midline, no adenopathy, thyroid: no enlargement/tenderness/nodules, no carotid bruit and no JVD. Back:   Symmetric, no curvature. ROM normal. No CVA tenderness. Lungs:   Clear to auscultation bilaterally. Heart:  Regular rate and rhythm, S1, S2 normal, no murmur       Abdomen:   Soft, non-tender. Bowel sounds normal.           Extremities: Extremities normal, atraumatic, no cyanosis or edema. Pulses: 2+ and symmetric all extremities. Skin: Skin color, texture, turgor normal. No rashes or lesions. Lymph nodes: Cervical, supraclavicular, and axillary nodes normal.   Neurologic: CNII-XII intact. Normal strength, sensation and reflexes throughout. Assessment/Plan   Diagnoses and all orders for this visit:    Essential hypertension  Well controlled on Lisinopril. Late onset Alzheimer's disease without behavioral disturbance  On Namenda and Aricept. Followed by Neurology. Anxiety  Doing well on Effexor.  No behavioral issues. Rosacea  On doxycycline. Crohn's disease of both small and large intestine without complication ([address]  Under controlled on Sulfasalazine. Mixed hyperlipidemia  -     LIPID PANEL  -     CBC WITH AUTOMATED DIFF  -     METABOLIC PANEL, COMPREHENSIVE    Screening-pulmonary TB  -     QUANTIFERON-TB GOLD PLUS  Paper work for Day care completed.     IGT (impaired glucose tolerance)  -     HEMOGLOBIN A1C WITH So Jordan MD  8/17/2019  7:36 PM

## 2019-08-08 NOTE — PROGRESS NOTES
Kirill Daly. is a 78 y.o. male    Chief Complaint   Patient presents with    Annual Wellness Visit     Routine Health Maintenance      1. Have you been to the ER, urgent care clinic since your last visit? Hospitalized since your last visit? No    2. Have you seen or consulted any other health care providers outside of the [address] [place] [address] since your last visit? Include any pap smears or colon screening.  No     Visit Vitals  /64 (BP 1 Location: Left arm, BP Patient Position: Sitting)   Pulse (!) 57   Temp 98.1 °F (36.7 °C) (Oral)   Resp 16   Ht 5' 4\" (1.626 m)   Wt 146 lb 3.2 oz (66.3 kg)   SpO2 96%   BMI 25.10 kg/m²

## 2019-08-09 LAB
ALBUMIN SERPL-MCNC: 3.8 G/DL (ref 3.5–4.8)
ALBUMIN/GLOB SERPL: 1.4 {RATIO} (ref 1.2–2.2)
ALP SERPL-CCNC: 61 IU/L (ref 39–117)
ALT SERPL-CCNC: 14 IU/L (ref 0–44)
AST SERPL-CCNC: 12 IU/L (ref 0–40)
BASOPHILS # BLD AUTO: 0.1 X10E3/UL (ref 0–0.2)
BASOPHILS NFR BLD AUTO: 1 %
BILIRUB SERPL-MCNC: 0.3 MG/DL (ref 0–1.2)
BUN SERPL-MCNC: 13 MG/DL (ref 8–27)
BUN/CREAT SERPL: 15 (ref 10–24)
CALCIUM SERPL-MCNC: 9.5 MG/DL (ref 8.6–10.2)
CHLORIDE SERPL-SCNC: 102 MMOL/L (ref 96–106)
CHOLEST SERPL-MCNC: 154 MG/DL (ref 100–199)
CO2 SERPL-SCNC: 27 MMOL/L (ref 20–29)
CREAT SERPL-MCNC: 0.84 MG/DL (ref 0.76–1.27)
EOSINOPHIL # BLD AUTO: 0.2 X10E3/UL (ref 0–0.4)
EOSINOPHIL NFR BLD AUTO: 2 %
ERYTHROCYTE [DISTWIDTH] IN BLOOD BY AUTOMATED COUNT: 12.8 % (ref 12.3–15.4)
EST. AVERAGE GLUCOSE BLD GHB EST-MCNC: 117 MG/DL
GLOBULIN SER CALC-MCNC: 2.7 G/DL (ref 1.5–4.5)
GLUCOSE SERPL-MCNC: 108 MG/DL (ref 65–99)
HBA1C MFR BLD: 5.7 % (ref 4.8–5.6)
HCT VFR BLD AUTO: 42.3 % (ref 37.5–51)
HDLC SERPL-MCNC: 51 MG/DL
HGB BLD-MCNC: 13.2 G/DL (ref 13–17.7)
IMM GRANULOCYTES # BLD AUTO: 0 X10E3/UL (ref 0–0.1)
IMM GRANULOCYTES NFR BLD AUTO: 0 %
LDLC SERPL CALC-MCNC: 82 MG/DL (ref 0–99)
LYMPHOCYTES # BLD AUTO: 1.2 X10E3/UL (ref 0.7–3.1)
LYMPHOCYTES NFR BLD AUTO: 17 %
MCH RBC QN AUTO: 30.1 PG (ref 26.6–33)
MCHC RBC AUTO-ENTMCNC: 31.2 G/DL (ref 31.5–35.7)
MCV RBC AUTO: 96 FL (ref 79–97)
MONOCYTES # BLD AUTO: 0.5 X10E3/UL (ref 0.1–0.9)
MONOCYTES NFR BLD AUTO: 8 %
NEUTROPHILS # BLD AUTO: 4.9 X10E3/UL (ref 1.4–7)
NEUTROPHILS NFR BLD AUTO: 72 %
PLATELET # BLD AUTO: 353 X10E3/UL (ref 150–450)
POTASSIUM SERPL-SCNC: 4.9 MMOL/L (ref 3.5–5.2)
PROT SERPL-MCNC: 6.5 G/DL (ref 6–8.5)
PSA SERPL-MCNC: 2 NG/ML (ref 0–4)
RBC # BLD AUTO: 4.39 X10E6/UL (ref 4.14–5.8)
SODIUM SERPL-SCNC: 142 MMOL/L (ref 134–144)
TRIGL SERPL-MCNC: 107 MG/DL (ref 0–149)
VLDLC SERPL CALC-MCNC: 21 MG/DL (ref 5–40)
WBC # BLD AUTO: 6.8 X10E3/UL (ref 3.4–10.8)

## 2019-08-12 NOTE — PROGRESS NOTES
Please let his wife know his blood glucose & HbA1C have improved. Prostate levels are in normal range. Cholesterol came back fine as well.

## 2019-08-15 LAB
GAMMA INTERFERON BACKGROUND BLD IA-ACNC: 0.05 IU/ML
M TB IFN-G BLD-IMP: NEGATIVE
M TB IFN-G CD4+ BCKGRND COR BLD-ACNC: 0.08 IU/ML
MITOGEN IGNF BLD-ACNC: 7.85 IU/ML
QUANTIFERON INCUBATION, QF1T: NORMAL
QUANTIFERON TB2 AG: 0.05 IU/ML
SERVICE CMNT-IMP: NORMAL

## 2019-08-16 ENCOUNTER — TELEPHONE (OUTPATIENT)
Dept: PRIMARY CARE CLINIC | Age: 69
End: 2019-08-16

## 2019-08-16 NOTE — PROGRESS NOTES
Called and left detailed message on secured voicemail for patient's wife, \"Daisy,\" and Auth PHI. Encouraged to call if she has further questions or concerns. End of encounter.

## 2019-08-19 ENCOUNTER — TELEPHONE (OUTPATIENT)
Dept: PRIMARY CARE CLINIC | Age: 69
End: 2019-08-19

## 2019-09-03 ENCOUNTER — TELEPHONE (OUTPATIENT)
Dept: PRIMARY CARE CLINIC | Age: 69
End: 2019-09-03

## 2019-09-03 NOTE — TELEPHONE ENCOUNTER
Called and left a voice mail for Vishnu Bernard advising I had not received any paper work at this time. Encouraged to call if she needs additional information. End of encounter.

## 2019-09-03 NOTE — TELEPHONE ENCOUNTER
----- Message from Vivian Donahue sent at 8/30/2019  4:16 PM EDT -----  Regarding: DR CALIX / Rafa Nearing Message/Vendor Calls     Marija FELIX from [facility] is  requesting to speak with nurse to confirm the receipt of documentation faxed to office.      Callback required       Best contact number(s): [phone]                Vivian Donahue

## 2019-09-10 ENCOUNTER — OFFICE VISIT (OUTPATIENT)
Dept: PRIMARY CARE CLINIC | Age: 69
End: 2019-09-10

## 2019-09-10 VITALS
DIASTOLIC BLOOD PRESSURE: 62 MMHG | TEMPERATURE: 97.9 F | RESPIRATION RATE: 18 BRPM | SYSTOLIC BLOOD PRESSURE: 133 MMHG | HEART RATE: 56 BPM | HEIGHT: 64 IN | WEIGHT: 146 LBS | BODY MASS INDEX: 24.92 KG/M2 | OXYGEN SATURATION: 98 %

## 2019-09-10 DIAGNOSIS — F03.90 DEMENTIA WITHOUT BEHAVIORAL DISTURBANCE, UNSPECIFIED DEMENTIA TYPE: ICD-10-CM

## 2019-09-10 DIAGNOSIS — L23.7 CONTACT DERMATITIS DUE TO POISON IVY: Primary | ICD-10-CM

## 2019-09-10 RX ORDER — BETAMETHASONE DIPROPIONATE 0.5 MG/G
OINTMENT TOPICAL 2 TIMES DAILY
Qty: 15 G | Refills: 0 | Status: SHIPPED | OUTPATIENT
Start: 2019-09-10

## 2019-09-10 RX ORDER — METHYLPREDNISOLONE 4 MG/1
TABLET ORAL
Qty: 1 DOSE PACK | Refills: 0 | Status: SHIPPED | OUTPATIENT
Start: 2019-09-10 | End: 2020-06-05 | Stop reason: ALTCHOICE

## 2019-09-10 RX ORDER — HYDROCORTISONE 1 %
CREAM (GRAM) TOPICAL 2 TIMES DAILY
COMMUNITY
End: 2019-09-10 | Stop reason: ALTCHOICE

## 2019-09-10 NOTE — PROGRESS NOTES
All health maintenance and other pertinent information has been reviewed in preparation for today's office visit. Patient presents in the office today for:       Chief Complaint   Patient presents with    Skin Problem     Pt c/o poison IVY infection on finger of left hand (pt was unable to attend adult care due to infection on fingers)     1. Have you been to the ER, urgent care clinic since your last visit? Hospitalized since your last visit? No    2. Have you seen or consulted any other health care providers outside of the [address] [place] [address] since your last visit? Include any pap smears or colon screening.  No

## 2019-09-10 NOTE — LETTER
9/10/2019 1:14 PM 
 
[address]. [phone] To Whom It May Concern: 
 
Dm Alfonso. is currently under the care of [facility]. Patient was seen today in office for contact dermatitis due to poison ivy. Medication was prescribed. He can attend adult day-care. Family was instructed to have patient wear cotton gloves. If there are questions or concerns please have the patient contact our office. Sincerely, Deni Salas MD

## 2019-09-10 NOTE — PROGRESS NOTES
Written by Yolanda Watson, as dictated by Dr. Jeancarlos Holcomb MD.    Adali Guerra. is a 78 y.o. male. HPI  The patient presents today c/o poison ivy infection between the middle and ring finger of L hand. Pt is accompanied by his wife, who presents patient's history. Wife reports that pt has been using cortisone-10 for the rash. She believes that he might have gotten it when he was helping her pull weeds a few weeks back, but is not sure on the timeline. Wife reports that they noticed it when he was playing with his wedding ring recently. As a result of the rash, he has not been able to go to adult day-care, do to risk of being contagious. Wife reports that pt has been doing well while at adult day-care, and is making new friends. She reports that the pt enjoys being there, which is why the poison ivy infection has been a hindrance thus far. Patient Active Problem List   Diagnosis Code    Benign essential hypertension I10    Hypercholesterolemia E78.00    Pre-diabetes R73.03    Dementia without behavioral disturbance F03.90        Current Outpatient Medications on File Prior to Visit   Medication Sig Dispense Refill    sulfaSALAzine (AZULFIDINE) 500 mg tablet Take 500 mg by mouth four (4) times daily.  donepezil (ARICEPT) 10 mg tablet Take 1 Tab by mouth nightly. 90 Tab 4    memantine (NAMENDA) 10 mg tablet Take 1 Tab by mouth two (2) times a day. 180 Tab 4    venlafaxine-SR (EFFEXOR-XR) 37.5 mg capsule TAKE ONE CAPSULE BY MOUTH ONCE DAILY 30 Cap 11    pravastatin (PRAVACHOL) 40 mg tablet TAKE ONE TABLET BY MOUTH NIGHTLY 90 Tab 3    doxycycline (MONODOX) 100 mg capsule Take 1 Cap by mouth daily. Rosacea   Indications: ACNE ROSACEA, Every 3 days 30 Cap 3    coenzyme q10 (CO Q-10) 10 mg cap Take  by mouth.  cholecalciferol (VITAMIN D3) 1,000 unit tablet Take  by mouth daily.  vitamin E (AQUA GEMS) 400 unit capsule Take 400 Units by mouth daily.  cyanocobalamin (VITAMIN B-12) 1,000 mcg tablet Take 1,000 mcg by mouth daily. Indications: takes 3 days a week      TURMERIC (CURCUMIN) by Does Not Apply route.  multivitamin (ONE A DAY) tablet Take 1 Tab by mouth daily.  aspirin delayed-release 81 mg tablet Take 81 mg by mouth every three (3) days.  lisinopril (PRINIVIL, ZESTRIL) 5 mg tablet TAKE ONE TABLET BY MOUTH ONCE DAILY 90 Tab 3    VIT C/E/B6/FA/B12/ARGIN/PEP XT (CARDIOTEK, BIOPERINE, PO) Take  by mouth.  magnesium 250 mg tab Take  by mouth daily. Indications: takes 3 times a week       No current facility-administered medications on file prior to visit.         Past Medical History:   Diagnosis Date    Altered glucose metabolism     Crohn's disease ([facility]     Hypercholesterolemia     Hypertension     Rosacea        Past Surgical History:   Procedure Laterality Date    CHEST SURGERY PROCEDURE UNLISTED      rib fracturewith hematoma    HX APPENDECTOMY      HX COLONOSCOPY  2013    10 years    HX ORTHOPAEDIC      heel injury requiring skin graft    HX TONSILLECTOMY         Family History   Problem Relation Age of Onset    Arthritis-osteo Mother     Heart Disease Father 58        acute MI    Heart Disease Paternal Grandfather 62       Social History     Socioeconomic History    Marital status:      Spouse name: Not on file    Number of children: Not on file    Years of education: Not on file    Highest education level: Not on file   Occupational History    Occupation: , 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   Social Needs    Financial resource strain: Not on file    Food insecurity:     Worry: Not on file     Inability: Not on file    Transportation needs:     Medical: Not on file     Non-medical: Not on file   Tobacco Use    Smoking status: Never Smoker    Smokeless tobacco: Never Used   Substance and Sexual Activity    Alcohol use: No    Drug use: No    Sexual activity: Not Currently   Lifestyle    Physical activity:     Days per week: Not on file     Minutes per session: Not on file    Stress: Not on file   Relationships    Social connections:     Talks on phone: Not on file     Gets together: Not on file     Attends Rastafari service: Not on file     Active member of club or organization: Not on file     Attends meetings of clubs or organizations: Not on file     Relationship status: Not on file    Intimate partner violence:     Fear of current or ex partner: Not on file     Emotionally abused: Not on file     Physically abused: Not on file     Forced sexual activity: Not on file   Other Topics Concern    Not on file   Social History Narrative    , retired . Office Visit on 08/08/2019   Component Date Value Ref Range Status    Cholesterol, total 08/08/2019 154  100 - 199 mg/dL Final    Triglyceride 08/08/2019 107  0 - 149 mg/dL Final    HDL Cholesterol 08/08/2019 51  >39 mg/dL Final    VLDL, calculated 08/08/2019 21  5 - 40 mg/dL Final    LDL, calculated 08/08/2019 82  0 - 99 mg/dL Final    WBC 08/08/2019 6.8  3.4 - 10.8 x10E3/uL Final    RBC 08/08/2019 4.39  4.14 - 5.80 x10E6/uL Final    HGB 08/08/2019 13.2  13.0 - 17.7 g/dL Final    HCT 08/08/2019 42.3  37.5 - 51.0 % Final    MCV 08/08/2019 96  79 - 97 fL Final    MCH 08/08/2019 30.1  26.6 - 33.0 pg Final    MCHC 08/08/2019 31.2* 31.5 - 35.7 g/dL Final    RDW 08/08/2019 12.8  12.3 - 15.4 % Final    PLATELET 80/55/4104 865  150 - 450 x10E3/uL Final    NEUTROPHILS 08/08/2019 72  Not Estab. % Final    Lymphocytes 08/08/2019 17  Not Estab. % Final    MONOCYTES 08/08/2019 8  Not Estab. % Final    EOSINOPHILS 08/08/2019 2  Not Estab. % Final    BASOPHILS 08/08/2019 1  Not Estab. % Final    ABS. NEUTROPHILS 08/08/2019 4.9  1.4 - 7.0 x10E3/uL Final    Abs Lymphocytes 08/08/2019 1.2  0.7 - 3.1 x10E3/uL Final    ABS. MONOCYTES 08/08/2019 0.5  0.1 - 0.9 x10E3/uL Final    ABS.  EOSINOPHILS 08/08/2019 0.2  0.0 - 0.4 x10E3/uL Final    ABS. BASOPHILS 08/08/2019 0.1  0.0 - 0.2 x10E3/uL Final    IMMATURE GRANULOCYTES 08/08/2019 0  Not Estab. % Final    ABS. IMM. GRANS. 08/08/2019 0.0  0.0 - 0.1 x10E3/uL Final    Glucose 08/08/2019 108* 65 - 99 mg/dL Final    BUN 08/08/2019 13  8 - 27 mg/dL Final    Creatinine 08/08/2019 0.84  0.76 - 1.27 mg/dL Final    GFR est non-AA 08/08/2019 89  >59 mL/min/1.73 Final    GFR est AA 08/08/2019 103  >59 mL/min/1.73 Final    BUN/Creatinine ratio 08/08/2019 15  10 - 24 Final    Sodium 08/08/2019 142  134 - 144 mmol/L Final    Potassium 08/08/2019 4.9  3.5 - 5.2 mmol/L Final    Chloride 08/08/2019 102  96 - 106 mmol/L Final    CO2 08/08/2019 27  20 - 29 mmol/L Final    Calcium 08/08/2019 9.5  8.6 - 10.2 mg/dL Final    Protein, total 08/08/2019 6.5  6.0 - 8.5 g/dL Final    Albumin 08/08/2019 3.8  3.5 - 4.8 g/dL Final    GLOBULIN, TOTAL 08/08/2019 2.7  1.5 - 4.5 g/dL Final    A-G Ratio 08/08/2019 1.4  1.2 - 2.2 Final    Bilirubin, total 08/08/2019 0.3  0.0 - 1.2 mg/dL Final    Alk.  phosphatase 08/08/2019 61  39 - 117 IU/L Final    AST (SGOT) 08/08/2019 12  0 - 40 IU/L Final    ALT (SGPT) 08/08/2019 14  0 - 44 IU/L Final    QuantiFERON TB1 Ag 08/08/2019 0.08  IU/mL Final    QuantiFERON TB2 Ag 08/08/2019 0.05  IU/mL Final    QuantiFERON Nil Value 08/08/2019 0.05  IU/mL Final    QuantiFERON Mitogen Value 08/08/2019 7.85  IU/mL Final    QuantiFERON Plus 08/08/2019 Negative  Negative Final    Hemoglobin A1c 08/08/2019 5.7* 4.8 - 5.6 % Final    Estimated average glucose 08/08/2019 117  mg/dL Final    Prostate Specific Ag 08/08/2019 2.0  0.0 - 4.0 ng/mL Final       Review of Systems   Unable to perform ROS: Dementia     Visit Vitals  /62 (BP 1 Location: Left arm, BP Patient Position: Sitting)   Pulse (!) 56   Temp 97.9 °F (36.6 °C) (Oral)   Resp 18   Ht 5' 4\" (1.626 m)   Wt 146 lb (66.2 kg)   SpO2 98%   BMI 25.06 kg/m²       Physical Exam   Constitutional: He is oriented to person, place, and time. He appears well-nourished. No distress. HENT:   Head: Normocephalic and atraumatic. Right Ear: External ear normal.   Left Ear: External ear normal.   Eyes: Pupils are equal, round, and reactive to light. Conjunctivae and EOM are normal.   Neck: Normal range of motion. Neck supple. Cardiovascular: Normal rate, regular rhythm and normal heart sounds. Exam reveals no gallop and no friction rub. No murmur heard. Pulmonary/Chest: Effort normal and breath sounds normal. He has no wheezes. Abdominal: Soft. Bowel sounds are normal.   Musculoskeletal: Normal range of motion. Neurological: He is alert and oriented to person, place, and time. He has normal reflexes. Skin: Rash (with oozing) noted. Psychiatric: He has a normal mood and affect. His behavior is normal. Thought content normal.   Nursing note and vitals reviewed. ASSESSMENT and PLAN    ICD-10-CM ICD-9-CM    1. Contact dermatitis due to poison ivy L23.7 692.6 augmented betamethasone dipropionate (DIPROLENE-AF) 0.05 % ointment sent to pharmacy. methylPREDNISolone (MEDROL DOSEPACK) 4 mg tablet sent to pharmacy. Diprolene-AF 0.05 % ointment prescribed. Advised that there is concern for bacterial infection secondary to the poison ivy, which is why the cream is being prescribed to help resolve the rash. Advised pt should wear cotton gloves while he is in adult day care, but not to use bandaids. 2. Dementia without behavioral disturbance, unspecified dementia type F03.90 294.20 Stable on Aricept & Namenda. Followed by Neurology. This plan was reviewed with the patient and patient agrees. All questions were answered. This scribe documentation was reviewed by me and accurately reflects the examination and decisions made by me. This note will not be viewable in [address]

## 2019-09-16 ENCOUNTER — TELEPHONE (OUTPATIENT)
Dept: PRIMARY CARE CLINIC | Age: 69
End: 2019-09-16

## 2019-09-16 NOTE — TELEPHONE ENCOUNTER
Called and spoke with Patient's wife who indicates this was an old message not a new message, reports that she did not take him to Adult Day Care last week, but he did go this morning and he has finished all of his medications. No other questions or concerns voiced. Encouraged to call the office as needed. End of encounter.

## 2019-09-16 NOTE — TELEPHONE ENCOUNTER
----- Message from Jesika Sanchez sent at 9/9/2019 10:44 AM EDT -----  Regarding: Dr. De La Vega Franc: [phone]  Pt's wife, Mary Grace Sanchez, is requesting a return call regarding the pt having poison ivy between his two fingers. Pt cannot return to to adult day care with this and Bobby Alvarez would like know if anything can be done, beside just using calamine lotion.

## 2019-09-20 ENCOUNTER — TELEPHONE (OUTPATIENT)
Dept: PRIMARY CARE CLINIC | Age: 69
End: 2019-09-20

## 2019-09-26 DIAGNOSIS — E78.00 HYPERCHOLESTEROLEMIA: ICD-10-CM

## 2019-09-26 RX ORDER — PRAVASTATIN SODIUM 40 MG/1
TABLET ORAL
Qty: 90 TAB | Refills: 3 | Status: SHIPPED | OUTPATIENT
Start: 2019-09-26 | End: 2020-02-24 | Stop reason: SDUPTHER

## 2019-10-24 ENCOUNTER — OFFICE VISIT (OUTPATIENT)
Dept: NEUROLOGY | Age: 69
End: 2019-10-24

## 2019-10-24 VITALS
OXYGEN SATURATION: 98 % | HEART RATE: 70 BPM | BODY MASS INDEX: 24.59 KG/M2 | DIASTOLIC BLOOD PRESSURE: 65 MMHG | WEIGHT: 144 LBS | SYSTOLIC BLOOD PRESSURE: 142 MMHG | HEIGHT: 64 IN

## 2019-10-24 DIAGNOSIS — F03.90 DEMENTIA WITHOUT BEHAVIORAL DISTURBANCE, UNSPECIFIED DEMENTIA TYPE: Primary | ICD-10-CM

## 2019-10-24 NOTE — PATIENT INSTRUCTIONS

## 2019-10-24 NOTE — PROGRESS NOTES
Neurology Consult Note      HISTORY PROVIDED BY: patient and wife    Chief Complaint:   Chief Complaint   Patient presents with    Follow-up     dementia      Subjective:   Pt is a 69y. o. left handed male last seen in clinic on 4/25/19 in f/u for dementia, possible FTD vs AD, with negative amyloid PET scan through the [address], with progressive memory loss first noticed around 2013, becoming very noticeable in 2015 with Neuropsychological testing by Dr. Ethlyn Halsted in March, 2015 with data supporting a memory issue as well as for other cognitive issues including fluency and naming. 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 MRI brain w/wo contrast 6/19/15 with left temporal atrophy with asymmetric lateral ventricle on the left. Pt has anxiety and depression, but no other significant change in personality, compulsive behaviors, disinhibition, or apathy reported by his wife. Mood improved on Effexor XR 37.5mg daily. Exam was non-focal, MMSE score 14/30, stable over last 6 months.   -Continued Effexor XR 37.5 for mood. -Continued Aricept 10mg daily and Memantine 10mg bid. He returns for f/u. His wife provides all of the history, pt is unable to do so. He has started at the [facility] 3 days a week, he is enjoying visiting with friends from support group. He tells me that he works out while there. They both feel his memory has declined. She is also concerned about auditory processing, has a hearing test coming up. Mood is stable. No behavior issues. Sleeping well at night, 12 hours, and will nap if he is not active. Eating \"ok\", appetite seems to have decreased. They have activated his long term care insurance, had MMSE with the RN from the company at their home, now six month wait until insurance coverage starts.        Past Medical History:   Diagnosis Date    Altered glucose metabolism     [facility]     [facility]     Hypercholesterolemia     Hypertension     Rosacea       Past Surgical History:   Procedure Laterality Date    CHEST SURGERY PROCEDURE UNLISTED      rib fracturewith hematoma    HX APPENDECTOMY      HX COLONOSCOPY  2013    10 years    HX ORTHOPAEDIC      heel injury requiring skin graft    HX TONSILLECTOMY        Social History     Socioeconomic History    Marital status:      Spouse name: Not on file    Number of children: Not on file    Years of education: Not on file    Highest education level: Not on file   Occupational History    Occupation: , 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   Social Needs    Financial resource strain: Not on file    Food insecurity:     Worry: Not on file     Inability: Not on file    Transportation needs:     Medical: Not on file     Non-medical: Not on file   Tobacco Use    Smoking status: Never Smoker    Smokeless tobacco: Never Used   Substance and Sexual Activity    Alcohol use: No    Drug use: No    Sexual activity: Not Currently   Lifestyle    Physical activity:     Days per week: Not on file     Minutes per session: Not on file    Stress: Not on file   Relationships    Social connections:     Talks on phone: Not on file     Gets together: Not on file     Attends Mu-ism service: Not on file     Active member of club or organization: Not on file     Attends meetings of clubs or organizations: Not on file     Relationship status: Not on file    Intimate partner violence:     Fear of current or ex partner: Not on file     Emotionally abused: Not on file     Physically abused: Not on file     Forced sexual activity: Not on file   Other Topics Concern    Not on file   Social History Narrative    , retired .        Family History   Problem Relation Age of Onset   Miriam Hannon Arthritis-osteo Mother     Heart Disease Father 58        acute MI    Heart Disease Paternal Grandfather 62         Objective: Review of Systems : Per HPI, o/w neg      No Known Allergies     Meds:  Outpatient Medications Prior to Visit   Medication Sig Dispense Refill    pravastatin (PRAVACHOL) 40 mg tablet TAKE ONE TABLET BY MOUTH NIGHTLY 90 Tab 3    sulfaSALAzine (AZULFIDINE) 500 mg tablet Take 500 mg by mouth four (4) times daily.  donepezil (ARICEPT) 10 mg tablet Take 1 Tab by mouth nightly. 90 Tab 4    memantine (NAMENDA) 10 mg tablet Take 1 Tab by mouth two (2) times a day. 180 Tab 4    venlafaxine-SR (EFFEXOR-XR) 37.5 mg capsule TAKE ONE CAPSULE BY MOUTH ONCE DAILY 30 Cap 11    doxycycline (MONODOX) 100 mg capsule Take 1 Cap by mouth daily. Rosacea   Indications: ACNE ROSACEA, Every 3 days 30 Cap 3    coenzyme q10 (CO Q-10) 10 mg cap Take  by mouth.  cholecalciferol (VITAMIN D3) 1,000 unit tablet Take  by mouth daily.  vitamin E (AQUA GEMS) 400 unit capsule Take 400 Units by mouth daily.  cyanocobalamin (VITAMIN B-12) 1,000 mcg tablet Take 1,000 mcg by mouth daily. Indications: takes 3 days a week      TURMERIC (CURCUMIN) by Does Not Apply route.  multivitamin (ONE A DAY) tablet Take 1 Tab by mouth daily.  aspirin delayed-release 81 mg tablet Take 81 mg by mouth every three (3) days.  augmented betamethasone dipropionate (DIPROLENE-AF) 0.05 % ointment Apply  to affected area two (2) times a day. 15 g 0    methylPREDNISolone (MEDROL DOSEPACK) 4 mg tablet As directed. 1 Dose Pack 0    lisinopril (PRINIVIL, ZESTRIL) 5 mg tablet TAKE ONE TABLET BY MOUTH ONCE DAILY 90 Tab 3    VIT C/E/B6/FA/B12/ARGIN/PEP XT (CARDIOTEK, BIOPERINE, PO) Take  by mouth.  magnesium 250 mg tab Take  by mouth daily. Indications: takes 3 times a week       No facility-administered medications prior to visit.         Imaging:  MRI Results (most recent):  Results from Hospital Encounter encounter on 04/24/18   MRI BRAIN RESEARCH WO CONT    Narrative INDICATION:  screening study for research     COMPARISON: None    TECHNIQUE:  MR imaging of the brain was performed per Alzheimer's research study  including the following sequences: axial FLAIR, GRE, noncontrast coronal MPRAGE  T1.    FINDINGS:      The ventricles are midline without hydrocephalus. There is no acute intra or  extra-axial fluid collection. There is mild periventricular T2 signal  hyperintensity in the supratentorial brain as well as a few scattered punctate  foci of T2 hyperintensity in the subcortical white matter of both frontal and  parietal lobes. There is no evidence of previous microhemorrhage. There is no  evidence of previous macro hemorrhage. There is no evidence of superficial  siderosis. Thus, no evidence of ARIA-H  There are no areas of sulcal effusion. There is no parenchymal vasogenic edema. Thus no evidence of ARIA-E The major  intracranial vascular flow-voids are patent. 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 No evidence of ARIA-H or  ARIA-E.        CT Results (most recent):  No results found for this or any previous visit.      Reviewed records in [facility] and [facility] tab today    Lab Review   Results for orders placed or performed in visit on 08/08/19   QUANTIFERON-TB GOLD PLUS   Result Value Ref Range    QuantiFERON Incubation Comment     QuantiFERON Criteria Comment     QuantiFERON TB1 Ag 0.08 IU/mL    QuantiFERON TB2 Ag 0.05 IU/mL    QuantiFERON Nil Value 0.05 IU/mL    QuantiFERON Mitogen Value 7.85 IU/mL    QuantiFERON Plus Negative Negative   LIPID PANEL   Result Value Ref Range    Cholesterol, total 154 100 - 199 mg/dL    Triglyceride 107 0 - 149 mg/dL    HDL Cholesterol 51 >39 mg/dL    VLDL, calculated 21 5 - 40 mg/dL    LDL, calculated 82 0 - 99 mg/dL   CBC WITH AUTOMATED DIFF   Result Value Ref Range    WBC 6.8 3.4 - 10.8 x10E3/uL    RBC 4.39 4.14 - 5.80 x10E6/uL    HGB 13.2 13.0 - 17.7 g/dL    HCT 42.3 37.5 - 51.0 %    MCV 96 79 - 97 fL    MCH 30.1 26.6 - 33.0 pg    MCHC 31.2 (L) 31.5 - 35.7 g/dL    RDW 12.8 12.3 - 15.4 %    PLATELET 357 950 - 828 x10E3/uL    NEUTROPHILS 72 Not Estab. %    Lymphocytes 17 Not Estab. %    MONOCYTES 8 Not Estab. %    EOSINOPHILS 2 Not Estab. %    BASOPHILS 1 Not Estab. %    ABS. NEUTROPHILS 4.9 1.4 - 7.0 x10E3/uL    Abs Lymphocytes 1.2 0.7 - 3.1 x10E3/uL    ABS. MONOCYTES 0.5 0.1 - 0.9 x10E3/uL    ABS. EOSINOPHILS 0.2 0.0 - 0.4 x10E3/uL    ABS. BASOPHILS 0.1 0.0 - 0.2 x10E3/uL    IMMATURE GRANULOCYTES 0 Not Estab. %    ABS. IMM. GRANS. 0.0 0.0 - 0.1 C30Q9/IK   METABOLIC PANEL, COMPREHENSIVE   Result Value Ref Range    Glucose 108 (H) 65 - 99 mg/dL    BUN 13 8 - 27 mg/dL    Creatinine 0.84 0.76 - 1.27 mg/dL    GFR est non-AA 89 >59 mL/min/1.73    GFR est  >59 mL/min/1.73    BUN/Creatinine ratio 15 10 - 24    Sodium 142 134 - 144 mmol/L    Potassium 4.9 3.5 - 5.2 mmol/L    Chloride 102 96 - 106 mmol/L    CO2 27 20 - 29 mmol/L    Calcium 9.5 8.6 - 10.2 mg/dL    Protein, total 6.5 6.0 - 8.5 g/dL    Albumin 3.8 3.5 - 4.8 g/dL    GLOBULIN, TOTAL 2.7 1.5 - 4.5 g/dL    A-G Ratio 1.4 1.2 - 2.2    Bilirubin, total 0.3 0.0 - 1.2 mg/dL    Alk. phosphatase 61 39 - 117 IU/L    AST (SGOT) 12 0 - 40 IU/L    ALT (SGPT) 14 0 - 44 IU/L   HEMOGLOBIN A1C WITH EAG   Result Value Ref Range    Hemoglobin A1c 5.7 (H) 4.8 - 5.6 %    Estimated average glucose 117 mg/dL   PSA SCREENING (SCREENING)   Result Value Ref Range    Prostate Specific Ag 2.0 0.0 - 4.0 ng/mL        Exam:  Visit Vitals  /65   Pulse 70   Ht 5' 4\" (1.626 m)   Wt 65.3 kg (144 lb)   SpO2 98%   BMI 24.72 kg/m²     General:  Alert, cooperative, no distress. Head:  Normocephalic, without obvious abnormality, atraumatic. Respiratory:  Heart:   Non-labored breathing  RRR, no murmurs   Neck:      Extremities: Warm, no edema   Pulses: 2+ radial pulses       Neurologic:  MS: Alert, speech intact. Language-see MMSE. Attention and fund of knowledge appropriate. Memory impaired.    Cranial Nerves:  II: visual fields    II: pupils    II: optic disc    III,VII: ptosis none   III,IV,VI: extraocular muscles  EOMI, no nystagmus    V: facial light touch sensation     VII: facial muscle function   symmetric   VIII: hearing intact   IX: soft palate elevation     XI: trapezius strength     XI: sternocleidomastoid strength    XII: tongue       Motor:   Sensory:   Coordination:   Gait:   Reflexes:     Mini Mental State Exam 10/24/2019 4/25/2019 10/18/2018 10/19/2017 4/13/2017 10/13/2016   What is the Year 0 0 1 0 1 1   What is the Season 0 0 0 1 1 0   What is the Date 0 0 0 1 0 0   What is the Day 0 1 1 1 1 1   What is the Month 0 0 0 1 1 1   Where are we State 1 1 1 1 1 1   Where are we Country 1 0 0 1 1 1   Where are we [address] [place] [address] or [facility] 1 1 1 1 1 1   Where are we Floor 0 0 1 1 1 1   Name three objects, then ask the patient to say them 2 3 3 3 3 3   Serial sevens Subtract 7 from 100 in increments 0 0 0 4 4 5   Ask for the three objects repeated above 0 1 0 1 0 0   Name a pencil 0 0 0 0 1 1   Name a watch 0 0 0 0 0 0   Have the patient repeat this phrase \"No ifs, ands, or buts\" 0 1 0 0 1 1   Three stage command: Take the paper in your right hand 1 1 1 1 1 1   Fold the paper in half 1 1 1 1 1 1   Put the paper on the floor 1 1 1 1 1 1   Read and obey the following: CLOSE YOUR EYES 1 1 1 1 1 1   Have the patient write a sentence 0 1 1 1 0 1   Have the patient copy a figure 1 1 0 1 1 1   Mini Mental Score 10 14 13 23 23 24          Assessment/Plan   Pt is a 69y. o. left handed male with dementia, possible FTD vs AD, with negative amyloid PET scan through the [address], with progressive memory loss first noticed around 2013, becoming very noticeable in 2015 with Neuropsychological testing by Dr. Brendon Garza in March, 2015 with data supporting a memory issue as well as for other cognitive issues including fluency and naming. 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  MRI brain w/wo contrast 6/19/15 with left temporal atrophy with asymmetric lateral ventricle on the left. Pt has anxiety and depression, but no other significant change in personality, compulsive behaviors, disinhibition, or apathy reported by his wife. Mood improved on Effexor XR 37.5mg daily. Exam is non-focal, noticeable decline in language, MMSE score 10/30, missing 7 orientation, 5 attn, 1/3 at immediate and 3/3 at delayed recall, unable to name, repeat, or write a sentence. Encouraged to continue going to [facility] and briefly discussed stopping Aricept/Namenda at some point in the future given significant decline, but will hold off for now given high level of function still.   -Continue Effexor XR 37.5 for mood. -Continue Aricept 10mg daily and Memantine 10mg bid.   -F/u in 6 months, instructed to call in the interim if needed. ICD-10-CM ICD-9-CM    1. Dementia without behavioral disturbance, unspecified [facility] ([facility] F03.90 294.20        Signed:   Arlen Julien MD  10/24/2019

## 2019-11-19 ENCOUNTER — OFFICE VISIT (OUTPATIENT)
Dept: PRIMARY CARE CLINIC | Age: 69
End: 2019-11-19

## 2019-11-19 VITALS
HEIGHT: 64 IN | TEMPERATURE: 98.4 F | HEART RATE: 75 BPM | DIASTOLIC BLOOD PRESSURE: 73 MMHG | BODY MASS INDEX: 24.59 KG/M2 | SYSTOLIC BLOOD PRESSURE: 107 MMHG | RESPIRATION RATE: 16 BRPM | OXYGEN SATURATION: 95 % | WEIGHT: 144 LBS

## 2019-11-19 DIAGNOSIS — R41.0 NEW ONSET OF CONFUSION: Primary | ICD-10-CM

## 2019-11-19 DIAGNOSIS — F03.90 DEMENTIA WITHOUT BEHAVIORAL DISTURBANCE, UNSPECIFIED DEMENTIA TYPE: ICD-10-CM

## 2019-11-19 DIAGNOSIS — R73.9 ELEVATED BLOOD SUGAR LEVEL: ICD-10-CM

## 2019-11-19 DIAGNOSIS — R35.89 POLYURIA: ICD-10-CM

## 2019-11-19 DIAGNOSIS — R06.6 HICCUPS: ICD-10-CM

## 2019-11-19 RX ORDER — METFORMIN HYDROCHLORIDE 500 MG/1
500 TABLET, EXTENDED RELEASE ORAL
Qty: 30 TAB | Refills: 1 | Status: SHIPPED | OUTPATIENT
Start: 2019-11-19 | End: 2020-01-13 | Stop reason: SDUPTHER

## 2019-11-19 NOTE — PROGRESS NOTES
Written by Yayo Reynolds, as dictated by Dr. Rosalba Flores MD.    Zechariah Callahan. is a 78 y.o. male. HPI  The patient presents today for follow-up on polyuria. Patient is accompanied by his wife who presents the patient's history as he has dementia. The patient developed a sudden onset of confusion on 11/17/19. As his wife was concerned that his confusion could be due to UTI, she took him to [facility]. Blood work was taken at that time, which showed his Glucose was around 220, which the wife believes was due to not being fasting at that time. The [facility] physician advised his confusion may be due to dehydration secondary to diabetes. Since that [facility] visit, he has been eating well, but otherwise, has only been drinking water to avoid the artificial sugar that can aggravate his Crohn's disease. He is still urinating a lot during the day and often, though he is not drinking as much water to compensate the urine lost. He was previously diagnosed with prediabetes, and Dr. Nima Purdy wanted to put him on Metformin, but decided not to, as he was on Sulfasalazine at the time. Wife reports the other doctors will be changing him from Sulfasalazine. His confusion is about 20-30% better as compared to Sunday, 11/17/19. The patient has also had the hiccups since his visit at [facility], where he had to drink 4 cups of water in order to get a urine sample. The hiccups have kept his wife awake at night due to the movement in the bed. She would like a recommendation on how to stop the hiccups. He has been sleeping well generally, and usually goes to bed after eating dinner, and sleep through the night until 7-8 am in the morning. His sleep was disrupted with the polyuria and since going to [facility].    He has also been sniffling all the time, but wife does not believe he has a cold. Wife also reports the patient's hand have been shaking more often.       Patient Active Problem List   Diagnosis Code    Benign essential hypertension I10    Hypercholesterolemia E78.00    Pre-diabetes R73.03    Dementia without behavioral disturbance ([facility] F03.90        Current Outpatient Medications on File Prior to Visit   Medication Sig Dispense Refill    pravastatin (PRAVACHOL) 40 mg tablet TAKE ONE TABLET BY MOUTH NIGHTLY 90 Tab 3    augmented betamethasone dipropionate (DIPROLENE-AF) 0.05 % ointment Apply  to affected area two (2) times a day. 15 g 0    donepezil (ARICEPT) 10 mg tablet Take 1 Tab by mouth nightly. 90 Tab 4    memantine (NAMENDA) 10 mg tablet Take 1 Tab by mouth two (2) times a day. 180 Tab 4    venlafaxine-SR (EFFEXOR-XR) 37.5 mg capsule TAKE ONE CAPSULE BY MOUTH ONCE DAILY 30 Cap 11    doxycycline (MONODOX) 100 mg capsule Take 1 Cap by mouth daily. Rosacea   Indications: ACNE ROSACEA, Every 3 days 30 Cap 3    coenzyme q10 (CO Q-10) 10 mg cap Take 1 Cap by mouth every three (3) days.  cholecalciferol (VITAMIN D3) 1,000 unit tablet Take 1,000 Units by mouth daily.  vitamin E (AQUA GEMS) 400 unit capsule Take 400 Units by mouth daily.  cyanocobalamin (VITAMIN B-12) 1,000 mcg tablet Take 1,000 mcg by mouth daily. Indications: takes 3 days a week      multivitamin (ONE A DAY) tablet Take 1 Tab by mouth daily.  aspirin delayed-release 81 mg tablet Take 81 mg by mouth every three (3) days.  methylPREDNISolone (MEDROL DOSEPACK) 4 mg tablet As directed. 1 Dose Pack 0    sulfaSALAzine (AZULFIDINE) 500 mg tablet Take 500 mg by mouth four (4) times daily.  lisinopril (PRINIVIL, ZESTRIL) 5 mg tablet TAKE ONE TABLET BY MOUTH ONCE DAILY (Patient not taking: Reported on 11/19/2019) 90 Tab 3    TURMERIC (CURCUMIN) by Does Not Apply route.  VIT C/E/B6/FA/B12/ARGIN/PEP XT (CARDIOTEK, BIOPERINE, PO) Take  by mouth.  magnesium 250 mg tab Take  by mouth daily.  Indications: takes 3 times a week       No current facility-administered medications on file prior to visit.         Past Medical History:   Diagnosis Date    Altered glucose metabolism     Cro[facility]'s disease ([facility]     Frontotemporal dementia ([facility]     Hypercholesterolemia     Hypertension     Rosacea        Past Surgical History:   Procedure Laterality Date    CHEST SURGERY PROCEDURE UNLISTED      rib fracturewith hematoma    HX APPENDECTOMY      HX COLONOSCOPY  2013    10 years    HX ORTHOPAEDIC      heel injury requiring skin graft    HX TONSILLECTOMY         Family History   Problem Relation Age of Onset    Arthritis-osteo Mother     Heart Disease Father 58        acute MI    Heart Disease Paternal Grandfather 62       Social History     Socioeconomic History    Marital status:      Spouse name: Not on file    Number of children: Not on file    Years of education: Not on file    Highest education level: Not on file   Occupational History    Occupation: , 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   Social Needs    Financial resource strain: Not on file    Food insecurity:     Worry: Not on file     Inability: Not on file    Transportation needs:     Medical: Not on file     Non-medical: Not on file   Tobacco Use    Smoking status: Never Smoker    Smokeless tobacco: Never Used   Substance and Sexual Activity    Alcohol use: No    Drug use: No    Sexual activity: Not Currently   Lifestyle    Physical activity:     Days per week: Not on file     Minutes per session: Not on file    Stress: Not on file   Relationships    Social connections:     Talks on phone: Not on file     Gets together: Not on file     Attends Yazidi service: Not on file     Active member of club or organization: Not on file     Attends meetings of clubs or organizations: Not on file     Relationship status: Not on file    Intimate partner violence:     Fear of current or ex partner: Not on file     Emotionally abused: Not on file     Physically abused: Not on file     Forced sexual activity: Not on file   Other Topics Concern    Not on file   Social History Narrative    , retired . Review of Systems   Unable to perform ROS: Dementia   Eyes: Negative for blurred vision and photophobia. Genitourinary: Positive for frequency. Negative for urgency. Neurological: Positive for tremors (BL hands). Psychiatric/Behavioral:        + confusion     Visit Vitals  /73   Pulse 75   Temp 98.4 °F (36.9 °C) (Oral)   Resp 16   Ht 5' 4\" (1.626 m)   Wt 144 lb (65.3 kg)   SpO2 95%   BMI 24.72 kg/m²         Physical Exam   Constitutional: He is oriented to person, place, and time. He appears well-developed and well-nourished. No distress. HENT:   Head: Normocephalic and atraumatic. Right Ear: External ear normal.   Left Ear: External ear normal.   Eyes: Pupils are equal, round, and reactive to light. Conjunctivae and EOM are normal. Right eye exhibits no discharge. Left eye exhibits no discharge. Neck: Normal range of motion. Neck supple. Cardiovascular: Normal rate, regular rhythm and normal heart sounds. Exam reveals no gallop and no friction rub. No murmur heard. Pulmonary/Chest: Effort normal and breath sounds normal. He has no wheezes. Abdominal: Soft. Bowel sounds are normal. There is no tenderness. Musculoskeletal: Normal range of motion. Neurological: He is alert and oriented to person, place, and time. He has normal reflexes. Skin: He is not diaphoretic. Psychiatric: He has a normal mood and affect. His behavior is normal. Thought content normal.   Nursing note and vitals reviewed. ASSESSMENT and PLAN    ICD-10-CM ICD-9-CM    1. New onset of confusion R41.0 298.9 Discussed with his wife  pt is dehydrated and may be causing the confusion. Advised pt to drink more water. 2. Elevated blood sugar level R73.9 790.29 HEMOGLOBIN A1C WITH EAG      METABOLIC PANEL, COMPREHENSIVE      metFORMIN ER (GLUCOPHAGE XR) 500 mg tablet sent to pharmacy. Hemoglobin A1c and CMP ordered. Metformin  mg prescribed. Potential side effects were discussed. Pt should take 1 tab daily. 3. Dementia without behavioral disturbance, unspecified dementia type ([facility] F03.90 294.20 Followed by Neurology. 4. Polyuria R35.8 788.42 metFORMIN ER (GLUCOPHAGE XR) 500 mg tablet sent to pharmacy. Metformin  mg prescribed. Potential side effects were discussed. Pt should take 1 tab daily. 5. Hiccups R06.6 786.8 Advised pt to breath into a paper bag for a few breaths to see if hiccups improve. This plan was reviewed with the patient and patient agrees. All questions were answered. This scribe documentation was reviewed by me and accurately reflects the examination and decisions made by me. This note will not be viewable in [address]

## 2019-11-19 NOTE — PROGRESS NOTES
Identified pt with two pt identifiers(name and ). Reviewed record in preparation for visit and have obtained necessary documentation. Chief Complaint   Patient presents with    Diabetes     f/u    Hiccups     constant for 2 days    Incontinence     UTI ruled out at [facility] on Sun 19        Health Maintenance Due   Topic    GLAUCOMA SCREENING Q2Y        Coordination of Care Questionnaire:  :   1) Have you been to an emergency room, urgent care, or hospitalized since your last visit? If yes, where when, and reason for visit? yes   - 19: [facility] for altered mental status    2. Have seen or consulted any other health care provider since your last visit? If yes, where when, and reason for visit? NO      3) Do you have an Advanced Directive/ Living Will in place? YES  If yes, do we have a copy on file YES  If no, would you like information NO    Patient is accompanied by spouse I have received verbal consent from Keon Akins. to discuss any/all medical information while they are present in the room.

## 2019-11-20 LAB
ALBUMIN SERPL-MCNC: 3.4 G/DL (ref 3.5–4.8)
ALBUMIN/GLOB SERPL: 1.3 {RATIO} (ref 1.2–2.2)
ALP SERPL-CCNC: 63 IU/L (ref 39–117)
ALT SERPL-CCNC: 30 IU/L (ref 0–44)
AST SERPL-CCNC: 41 IU/L (ref 0–40)
BILIRUB SERPL-MCNC: 0.3 MG/DL (ref 0–1.2)
BUN SERPL-MCNC: 18 MG/DL (ref 8–27)
BUN/CREAT SERPL: 23 (ref 10–24)
CALCIUM SERPL-MCNC: 8.8 MG/DL (ref 8.6–10.2)
CHLORIDE SERPL-SCNC: 96 MMOL/L (ref 96–106)
CO2 SERPL-SCNC: 24 MMOL/L (ref 20–29)
CREAT SERPL-MCNC: 0.79 MG/DL (ref 0.76–1.27)
EST. AVERAGE GLUCOSE BLD GHB EST-MCNC: 120 MG/DL
GLOBULIN SER CALC-MCNC: 2.6 G/DL (ref 1.5–4.5)
GLUCOSE SERPL-MCNC: 145 MG/DL (ref 65–99)
HBA1C MFR BLD: 5.8 % (ref 4.8–5.6)
POTASSIUM SERPL-SCNC: 4.5 MMOL/L (ref 3.5–5.2)
PROT SERPL-MCNC: 6 G/DL (ref 6–8.5)
SODIUM SERPL-SCNC: 133 MMOL/L (ref 134–144)

## 2019-11-30 RX ORDER — VENLAFAXINE HYDROCHLORIDE 37.5 MG/1
CAPSULE, EXTENDED RELEASE ORAL
Qty: 30 CAP | Refills: 11 | Status: SHIPPED | OUTPATIENT
Start: 2019-11-30 | End: 2020-01-08 | Stop reason: SDUPTHER

## 2020-01-03 ENCOUNTER — TELEPHONE (OUTPATIENT)
Dept: NEUROLOGY | Age: 70
End: 2020-01-03

## 2020-01-03 NOTE — TELEPHONE ENCOUNTER
Pt's wife calling about [facility] is going to be sending a fax about venlafaxine. Just wanted to give Dr Mary Cadena a heads up.

## 2020-01-08 RX ORDER — VENLAFAXINE HYDROCHLORIDE 37.5 MG/1
CAPSULE, EXTENDED RELEASE ORAL
Qty: 90 CAP | Refills: 3 | Status: SHIPPED | OUTPATIENT
Start: 2020-01-08 | End: 2020-08-04 | Stop reason: SDUPTHER

## 2020-01-13 DIAGNOSIS — R35.89 POLYURIA: ICD-10-CM

## 2020-01-13 DIAGNOSIS — R73.9 ELEVATED BLOOD SUGAR LEVEL: ICD-10-CM

## 2020-01-13 RX ORDER — METFORMIN HYDROCHLORIDE 500 MG/1
500 TABLET, EXTENDED RELEASE ORAL
Qty: 30 TAB | Refills: 1 | Status: SHIPPED | OUTPATIENT
Start: 2020-01-13 | End: 2020-01-14 | Stop reason: SDUPTHER

## 2020-01-14 DIAGNOSIS — R73.9 ELEVATED BLOOD SUGAR LEVEL: ICD-10-CM

## 2020-01-14 DIAGNOSIS — R35.89 POLYURIA: ICD-10-CM

## 2020-01-14 RX ORDER — METFORMIN HYDROCHLORIDE 500 MG/1
500 TABLET, EXTENDED RELEASE ORAL
Qty: 90 TAB | Refills: 0 | Status: SHIPPED | OUTPATIENT
Start: 2020-01-14 | End: 2020-02-24 | Stop reason: SDUPTHER

## 2020-02-20 DIAGNOSIS — E78.00 HYPERCHOLESTEROLEMIA: Primary | ICD-10-CM

## 2020-02-20 DIAGNOSIS — E78.00 HYPERCHOLESTEROLEMIA: ICD-10-CM

## 2020-02-20 DIAGNOSIS — R35.89 POLYURIA: ICD-10-CM

## 2020-02-20 DIAGNOSIS — R73.9 ELEVATED BLOOD SUGAR LEVEL: ICD-10-CM

## 2020-02-20 DIAGNOSIS — R73.02 IGT (IMPAIRED GLUCOSE TOLERANCE): ICD-10-CM

## 2020-02-20 NOTE — TELEPHONE ENCOUNTER
Called and advised patient's wife and Auth PHI and advised that labs have been ordered and she can come fasting anytime to have them drawn. End of encounter.

## 2020-02-20 NOTE — TELEPHONE ENCOUNTER
Patient needs orders put back for A1C, Cholesterol, Blood Sugar testing that  was concerned about. Patients knows to fast before labs. Would like a call back as soon as the orders are placed.

## 2020-02-22 LAB
CHOLEST SERPL-MCNC: 133 MG/DL (ref 100–199)
EST. AVERAGE GLUCOSE BLD GHB EST-MCNC: 157 MG/DL
HBA1C MFR BLD: 7.1 % (ref 4.8–5.6)
HDLC SERPL-MCNC: 50 MG/DL
LDLC SERPL CALC-MCNC: 62 MG/DL (ref 0–99)
TRIGL SERPL-MCNC: 106 MG/DL (ref 0–149)
VLDLC SERPL CALC-MCNC: 21 MG/DL (ref 5–40)

## 2020-02-23 NOTE — PROGRESS NOTES
Please ask his wife if he is taking Metformin regularly? His HbA1C has gone way up now. If he is taking Metformin twice a day then we need to increase the dose.

## 2020-02-24 RX ORDER — METFORMIN HYDROCHLORIDE 500 MG/1
500 TABLET, EXTENDED RELEASE ORAL 2 TIMES DAILY
Qty: 180 TAB | Refills: 0 | Status: SHIPPED | OUTPATIENT
Start: 2020-02-24 | End: 2020-04-29

## 2020-02-24 RX ORDER — PRAVASTATIN SODIUM 40 MG/1
TABLET ORAL
Qty: 90 TAB | Refills: 3 | Status: SHIPPED | OUTPATIENT
Start: 2020-02-24 | End: 2020-08-27 | Stop reason: ALTCHOICE

## 2020-02-24 RX ORDER — LISINOPRIL 5 MG/1
TABLET ORAL
Qty: 90 TAB | Refills: 3 | Status: SHIPPED | OUTPATIENT
Start: 2020-02-24 | End: 2020-08-27 | Stop reason: ALTCHOICE

## 2020-02-24 RX ORDER — MEMANTINE HYDROCHLORIDE 10 MG/1
10 TABLET ORAL 2 TIMES DAILY
Qty: 180 TAB | Refills: 4 | Status: SHIPPED | OUTPATIENT
Start: 2020-02-24 | End: 2020-08-11

## 2020-02-24 RX ORDER — DONEPEZIL HYDROCHLORIDE 10 MG/1
10 TABLET, FILM COATED ORAL
Qty: 90 TAB | Refills: 4 | Status: SHIPPED | OUTPATIENT
Start: 2020-02-24 | End: 2020-08-04

## 2020-02-24 NOTE — PROGRESS NOTES
Called and spoke with patient's wife and Auth SHIVAM and Nathaniel Ayon and she advises Dr. Javed Collins had advised that patient should only take Metformin daily and that if A1C was elevated would increase to twice daily Metformin. Advised to go to Metformin twice daily. Additionally, asking that all meds be sent for refills for [facility] mail order, including Metformin with new instructions. Lab letter mailed per request. End of encounter.

## 2020-04-29 DIAGNOSIS — R73.9 ELEVATED BLOOD SUGAR LEVEL: ICD-10-CM

## 2020-04-29 DIAGNOSIS — R35.89 POLYURIA: ICD-10-CM

## 2020-04-29 RX ORDER — METFORMIN HYDROCHLORIDE 500 MG/1
TABLET, EXTENDED RELEASE ORAL
Qty: 180 TAB | Refills: 0 | Status: SHIPPED | OUTPATIENT
Start: 2020-04-29 | End: 2020-06-05 | Stop reason: ALTCHOICE

## 2020-06-04 ENCOUNTER — TELEPHONE (OUTPATIENT)
Dept: PRIMARY CARE CLINIC | Age: 70
End: 2020-06-04

## 2020-06-04 NOTE — TELEPHONE ENCOUNTER
Returned call to patients wife/caregiver. She is concerned with patient's diabetes dx and trying to minimize his sugar intake but does not want to give artificile sweetner. Patient also has Chrohns disease and dementia. Concerned that his appetite is not good as he tells her nothing tastes good. Would like to discuss possibly receiving a glucometer to monitor his readings and/or medication adjustment, and getting lab work.    Telemed visit scheduled with Dr Darlene Jenkins for 6/5/20 at 200

## 2020-06-04 NOTE — TELEPHONE ENCOUNTER
Patients wife would like to speak to a nurse about patients newest diagnosis of diabetes. Would like to know if he needs to come in to get that checked.

## 2020-06-05 ENCOUNTER — VIRTUAL VISIT (OUTPATIENT)
Dept: PRIMARY CARE CLINIC | Age: 70
End: 2020-06-05

## 2020-06-05 DIAGNOSIS — K50.80 CROHN'S DISEASE OF BOTH SMALL AND LARGE INTESTINE WITHOUT COMPLICATION (HCC): ICD-10-CM

## 2020-06-05 DIAGNOSIS — F03.90 DEMENTIA WITHOUT BEHAVIORAL DISTURBANCE, UNSPECIFIED DEMENTIA TYPE: Primary | ICD-10-CM

## 2020-06-05 DIAGNOSIS — N39.45 CONTINUOUS LEAKAGE OF URINE: ICD-10-CM

## 2020-06-05 DIAGNOSIS — E11.9 CONTROLLED TYPE 2 DIABETES MELLITUS WITHOUT COMPLICATION, WITHOUT LONG-TERM CURRENT USE OF INSULIN (HCC): ICD-10-CM

## 2020-06-05 RX ORDER — INSULIN PUMP SYRINGE, 3 ML
EACH MISCELLANEOUS
Qty: 1 KIT | Refills: 0 | Status: SHIPPED | OUTPATIENT
Start: 2020-06-05

## 2020-06-05 RX ORDER — GLIMEPIRIDE 1 MG/1
1 TABLET ORAL
Qty: 90 TAB | Refills: 0 | Status: SHIPPED | OUTPATIENT
Start: 2020-06-05 | End: 2020-07-31

## 2020-06-05 NOTE — PROGRESS NOTES
Written by Misty Gomze, as dictated by Dr. Aarti Varner MD.    Raul Naranjo. is a 69 y.o. male. HPI  I was in the office while conducting this encounter. Consent:  He and/or his healthcare decision maker is aware that this patient-initiated Telehealth encounter is a billable service, with coverage as determined by his insurance carrier. He is aware that he may receive a bill and has provided verbal consent to proceed: Yes    This virtual visit was conducted via [employer]. Pursuant to the emergency declaration under the [facility], [facility] waiver authority and the [employer] and [employer] Act, this Virtual  Visit was conducted to reduce the patient's risk of exposure to COVID-19 and provide continuity of care for an established patient. Services were provided through a video synchronous discussion virtually to substitute for in-person clinic visit. Due to this being a TeleHealth evaluation, many elements of the physical examination are unable to be assessed. Pt presents virtually today for a f/u. He is non communicative. His wife speaks for him since she is his caretaker. He continues on Namenda 10 mg BID and Aricept 10 mg aPM.     Pt's wife notes that he is struggling to eat. His chron's specialist advised him to avoid artifical sweeteners but one of the only things he can eat is jello. He continues on Sulfasalazine. He is having urine and fecal incontinence. Pt continues on Metformin BID. He has been taking Imodium qPM to help with his diarrhea/loose BM. She wonders if diarrhea is a side effect of Metformin. . She notes that his hands shake occasionally and he has generalized weakness.      Patient Active Problem List   Diagnosis Code    Benign essential hypertension I10    Hypercholesterolemia E78.00    Dementia without behavioral disturbance ([facility] F03.90        Current Outpatient Medications on File Prior to Visit   Medication Sig Dispense Refill    donepeziL (ARICEPT) 10 mg tablet Take 1 Tab by mouth nightly. 90 Tab 4    lisinopril (PRINIVIL, ZESTRIL) 5 mg tablet TAKE ONE TABLET BY MOUTH ONCE DAILY 90 Tab 3    memantine (NAMENDA) 10 mg tablet Take 1 Tab by mouth two (2) times a day. 180 Tab 4    pravastatin (PRAVACHOL) 40 mg tablet TAKE ONE TABLET BY MOUTH NIGHTLY 90 Tab 3    [DISCONTINUED] metFORMIN ER (GLUCOPHAGE XR) 500 mg tablet TAKE 1 TABLET TWICE DAILY 180 Tab 0    venlafaxine-SR (EFFEXOR-XR) 37.5 mg capsule TAKE 1 CAPSULE BY MOUTH ONCE DAILY 90 Cap 3    augmented betamethasone dipropionate (DIPROLENE-AF) 0.05 % ointment Apply  to affected area two (2) times a day. 15 g 0    [DISCONTINUED] methylPREDNISolone (MEDROL DOSEPACK) 4 mg tablet As directed. 1 Dose Pack 0    sulfaSALAzine (AZULFIDINE) 500 mg tablet Take 500 mg by mouth four (4) times daily.  doxycycline (MONODOX) 100 mg capsule Take 1 Cap by mouth daily. Rosacea   Indications: ACNE ROSACEA, Every 3 days 30 Cap 3    coenzyme q10 (CO Q-10) 10 mg cap Take 1 Cap by mouth every three (3) days.  cholecalciferol (VITAMIN D3) 1,000 unit tablet Take 1,000 Units by mouth daily.  vitamin E (AQUA GEMS) 400 unit capsule Take 400 Units by mouth daily.  cyanocobalamin (VITAMIN B-12) 1,000 mcg tablet Take 1,000 mcg by mouth daily. Indications: takes 3 days a week      TURMERIC (CURCUMIN) by Does Not Apply route.  VIT C/E/B6/FA/B12/ARGIN/PEP XT (CARDIOTEK, BIOPERINE, PO) Take  by mouth.  magnesium 250 mg tab Take  by mouth daily. Indications: takes 3 times a week      multivitamin (ONE A DAY) tablet Take 1 Tab by mouth daily.  aspirin delayed-release 81 mg tablet Take 81 mg by mouth every three (3) days. No current facility-administered medications on file prior to visit.         No Known Allergies    Past Medical History:   Diagnosis Date    Altered glucose metabolism     Crohn's disease ([facility]     Frontotemporal dementia ([facility] [facility]     Hypercholesterolemia     Hypertension     Rosacea        Past Surgical History:   Procedure Laterality Date    CHEST SURGERY PROCEDURE UNLISTED      rib fracturewith hematoma    HX APPENDECTOMY      HX COLONOSCOPY  2013    10 years    HX ORTHOPAEDIC      heel injury requiring skin graft    HX TONSILLECTOMY         Family History   Problem Relation Age of Onset    Arthritis-osteo Mother     Heart Disease Father 58        acute MI    Heart Disease Paternal Grandfather 62       Social History     Socioeconomic History    Marital status:      Spouse name: Not on file    Number of children: Not on file    Years of education: Not on file    Highest education level: Not on file   Occupational History    Occupation: , 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   Social Needs    Financial resource strain: Not on file    Food insecurity     Worry: Not on file     Inability: Not on file    Transportation needs     Medical: Not on file     Non-medical: Not on file   Tobacco Use    Smoking status: Never Smoker    Smokeless tobacco: Never Used   Substance and Sexual Activity    Alcohol use: No    Drug use: No    Sexual activity: Not Currently   Lifestyle    Physical activity     Days per week: Not on file     Minutes per session: Not on file    Stress: Not on file   Relationships    Social connections     Talks on phone: Not on file     Gets together: Not on file     Attends Gnosticist service: Not on file     Active member of club or organization: Not on file     Attends meetings of clubs or organizations: Not on file     Relationship status: Not on file    Intimate partner violence     Fear of current or ex partner: Not on file     Emotionally abused: Not on file     Physically abused: Not on file     Forced sexual activity: Not on file   Other Topics Concern    Not on file   Social History Narrative    , retired . No visits with results within 3 Month(s) from this visit. Latest known visit with results is:   Orders Only on 02/20/2020   Component Date Value Ref Range Status    Hemoglobin A1c 02/21/2020 7.1* 4.8 - 5.6 % Final    Comment:          Prediabetes: 5.7 - 6.4           Diabetes: >6.4           Glycemic control for adults with diabetes: <7.0      Estimated average glucose 02/21/2020 157  mg/dL Final    Cholesterol, total 02/21/2020 133  100 - 199 mg/dL Final    Triglyceride 02/21/2020 106  0 - 149 mg/dL Final    HDL Cholesterol 02/21/2020 50  >39 mg/dL Final    VLDL, calculated 02/21/2020 21  5 - 40 mg/dL Final    LDL, calculated 02/21/2020 62  0 - 99 mg/dL Final       Review of Systems   Constitutional: Negative for malaise/fatigue and weight loss. HENT: Negative for congestion and hearing loss. Eyes: Negative for blurred vision and photophobia. Respiratory: Negative for cough and shortness of breath. Cardiovascular: Negative for chest pain and leg swelling. Gastrointestinal: Positive for diarrhea. Negative for constipation and heartburn. Genitourinary: Negative for dysuria, frequency and urgency. Musculoskeletal: Negative for joint pain and myalgias. Neurological: Negative for dizziness and headaches. Psychiatric/Behavioral: Negative for depression and substance abuse. The patient is not nervous/anxious and does not have insomnia. There were no vitals taken for this visit. Physical Exam  Constitutional:       General: He is not in acute distress. Appearance: Normal appearance. He is not diaphoretic. HENT:      Head: Normocephalic and atraumatic. Nose: No congestion. Eyes:      General:         Right eye: No discharge. Left eye: No discharge. Conjunctiva/sclera: Conjunctivae normal.   Pulmonary:      Effort: Pulmonary effort is normal. No respiratory distress. Neurological:      Mental Status: Mental status is at baseline.    Psychiatric: Speech: Speech is delayed. Behavior: Behavior normal.         Thought Content: Thought content normal.         Cognition and Memory: Cognition is impaired. Memory is impaired. ASSESSMENT and PLAN    ICD-10-CM ICD-9-CM    1. Dementia without behavioral disturbance, unspecified dementia type [facility] F03.90 294.20 Stable, pt's wife continues to monitor and manage this as his primary care taker. Continues on Namenda and Aricept. 2. Continuous leakage of urine N39.45 788. 40 pt's wife continues to monitor and manage this as his primary care taker. Told her let me know if diarrhea doesn`t improve after stopping Metformin. 3. Controlled type 2 diabetes mellitus without complication, without long-term current use of insulin ([facility]) E11.9 250.00 Blood-Glucose Meter monitoring kit sent to pharmacy. glucose blood VI test strips (blood glucose test) strip sent to pharmacy. glimepiride (AMARYL) 1 mg tablet sent to pharmacy. Described and prescribed Glimepiride  and a glucometer. Potential side effects were discussed. Explained that he cannot skip meals while taking this medication. Discussed with pt that she can check his BG PRN if she is concerned about hypoglycemic sx. 4. Crohn's disease of both small and large intestine without complication ([facility]) M58.31 555.2 Followed by GI . On sulfasaalzine. This plan was reviewed with the patient and patient agrees. All questions were answered. This scribe documentation was reviewed by me and accurately reflects the examination and decisions made by me. This note will not be viewable in [address]

## 2020-06-10 ENCOUNTER — TELEPHONE (OUTPATIENT)
Dept: PRIMARY CARE CLINIC | Age: 70
End: 2020-06-10

## 2020-06-16 ENCOUNTER — TELEPHONE (OUTPATIENT)
Dept: PRIMARY CARE CLINIC | Age: 70
End: 2020-06-16

## 2020-06-16 NOTE — TELEPHONE ENCOUNTER
Confirmed speaking to wife of patient and she is on the hippa form. Gracie Teixeira (wife) is concerned about the dosage amount of glimepiride compared to metformin. Changed due to the recall of metformin. Advised that it is the correct dosage that glimepiride is for controlling blood sugars but it 1 mg of glimepiride is comparable to dosage of 1000 mg of metformin.      Wife will start paitent on the medication tomorrow

## 2020-06-16 NOTE — TELEPHONE ENCOUNTER
----- Message from Deja Redding sent at 6/16/2020  2:11 PM EDT -----  Regarding: /Telephone  General Message/Vendor Calls    Caller's first and last name:  Daisy-Wife    Reason for call:  \"Glimepiride\"    Callback required yes/no and why:  Yes, to discuss the change from \"Metformin\" to \"Glimepiride\"    Best contact number(s):  [phone]    Details to clarify the request:      Deja Redding

## 2020-07-31 DIAGNOSIS — E11.9 CONTROLLED TYPE 2 DIABETES MELLITUS WITHOUT COMPLICATION, WITHOUT LONG-TERM CURRENT USE OF INSULIN (HCC): ICD-10-CM

## 2020-07-31 RX ORDER — GLIMEPIRIDE 1 MG/1
TABLET ORAL
Qty: 90 TAB | Refills: 0 | Status: SHIPPED | OUTPATIENT
Start: 2020-07-31 | End: 2020-08-27 | Stop reason: SDUPTHER

## 2020-08-04 ENCOUNTER — TELEPHONE (OUTPATIENT)
Dept: NEUROLOGY | Facility: CLINIC | Age: 70
End: 2020-08-04

## 2020-08-04 ENCOUNTER — OFFICE VISIT (OUTPATIENT)
Dept: NEUROLOGY | Facility: CLINIC | Age: 70
End: 2020-08-04
Payer: MEDICARE

## 2020-08-04 VITALS
HEART RATE: 68 BPM | DIASTOLIC BLOOD PRESSURE: 60 MMHG | BODY MASS INDEX: 24.72 KG/M2 | RESPIRATION RATE: 18 BRPM | WEIGHT: 144 LBS | SYSTOLIC BLOOD PRESSURE: 126 MMHG | OXYGEN SATURATION: 98 %

## 2020-08-04 DIAGNOSIS — F03.90 DEMENTIA WITHOUT BEHAVIORAL DISTURBANCE, UNSPECIFIED DEMENTIA TYPE: Primary | ICD-10-CM

## 2020-08-04 DIAGNOSIS — R45.4 IRRITABILITY AND ANGER: ICD-10-CM

## 2020-08-04 PROCEDURE — 99213 OFFICE O/P EST LOW 20 MIN: CPT | Performed by: PSYCHIATRY & NEUROLOGY

## 2020-08-04 PROCEDURE — G8754 DIAS BP LESS 90: HCPCS | Performed by: PSYCHIATRY & NEUROLOGY

## 2020-08-04 PROCEDURE — G8536 NO DOC ELDER MAL SCRN: HCPCS | Performed by: PSYCHIATRY & NEUROLOGY

## 2020-08-04 PROCEDURE — G8427 DOCREV CUR MEDS BY ELIG CLIN: HCPCS | Performed by: PSYCHIATRY & NEUROLOGY

## 2020-08-04 PROCEDURE — G8752 SYS BP LESS 140: HCPCS | Performed by: PSYCHIATRY & NEUROLOGY

## 2020-08-04 PROCEDURE — 1101F PT FALLS ASSESS-DOCD LE1/YR: CPT | Performed by: PSYCHIATRY & NEUROLOGY

## 2020-08-04 PROCEDURE — G8510 SCR DEP NEG, NO PLAN REQD: HCPCS | Performed by: PSYCHIATRY & NEUROLOGY

## 2020-08-04 PROCEDURE — 3017F COLORECTAL CA SCREEN DOC REV: CPT | Performed by: PSYCHIATRY & NEUROLOGY

## 2020-08-04 PROCEDURE — G8420 CALC BMI NORM PARAMETERS: HCPCS | Performed by: PSYCHIATRY & NEUROLOGY

## 2020-08-04 RX ORDER — BALSALAZIDE DISODIUM 750 MG/1
2250 CAPSULE ORAL 3 TIMES DAILY
COMMUNITY

## 2020-08-04 RX ORDER — VENLAFAXINE HYDROCHLORIDE 75 MG/1
CAPSULE, EXTENDED RELEASE ORAL
Qty: 90 CAP | Refills: 1 | Status: SHIPPED | OUTPATIENT
Start: 2020-08-04

## 2020-08-04 NOTE — LETTER
8/4/20 Patient: Yesi Obrien. YOB: 1950 Date of Visit: 8/4/2020 Valeria Chambers MD 
[address] [place] [id number] VIA In Basket Dear Valeria Chambers MD, Thank you for referring Mr. Naresh Palmer to [address] [place] [address] for evaluation. My notes for this consultation are attached. If you have questions, please do not hesitate to call me. I look forward to following your patient along with you. Sincerely, Sandra Wells MD

## 2020-08-04 NOTE — TELEPHONE ENCOUNTER
Pt's wife calling to request Dr. Ammy Cartwright fill out paperwork for handicap parking.  Please call

## 2020-08-04 NOTE — PATIENT INSTRUCTIONS
PRESCRIPTION REFILL POLICY [facility] Statement to Patients April 1, 2014 In an effort to ensure the large volume of patient prescription refills is processed in the most efficient and expeditious manner, we are asking our patients to assist us by calling your Pharmacy for all prescription refills, this will include also your  Mail Order Pharmacy. The pharmacy will contact our office electronically to continue the refill process. Please do not wait until the last minute to call your pharmacy. We need at least 48 hours (2days) to fill prescriptions. We also encourage you to call your pharmacy before going to  your prescription to make sure it is ready. With regard to controlled substance prescription refill requests (narcotic refills) that need to be picked up at our office, we ask your cooperation by providing us with at least 72 hours (3days) notice that you will need a refill. We will not refill narcotic prescription refill requests after 4:00pm on any weekday, Monday through Thursday, or after 2:00pm on Fridays, or on the weekends. We encourage everyone to explore another way of getting your prescription refill request processed using [employer], our patient web portal through our electronic medical record system. [employer] is an efficient and effective way to communicate your medication request directly to the office and  downloadable as an hood on your smart phone . [employer] also features a review functionality that allows you to view your medication list as well as leave messages for your physician. Are you ready to get connected? If so please review the attatched instructions or speak to any of our staff to get you set up right away! Thank you so much for your cooperation. Should you have any questions please contact our Practice Administrator. The Physicians and Staff,  [facility]

## 2020-08-04 NOTE — PROGRESS NOTES
Neurology Consult Note      HISTORY PROVIDED BY: patient and wife    Chief Complaint:   Chief Complaint   Patient presents with    Dementia      Subjective:   Pt is a 70y.o. left handed male last seen in clinic on 10/24/19 in f/u for dementia, possible FTD vs AD, with negative amyloid PET scan through the [address], with progressive memory loss first noticed around 2013, becoming very noticeable in 2015 with Neuropsychological testing by Dr. Shirley Garcia in March, 2015 with data supporting a memory issue as well as for other cognitive issues including fluency and naming. 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 Pt has anxiety and depression, but no other significant change in personality, compulsive behaviors, disinhibition, or apathy reported by his wife. Mood improved on Effexor XR 37.5mg daily. Exam was non-focal, noticeable decline in language, MMSE score 10/30, missing 7 orientation, 5 attn, 1/3 at immediate and 3/3 at delayed recall, unable to name, repeat, or write a sentence. Encouraged to continue going to [facility] and briefly discussed stopping Aricept/Namenda at some point in the future given significant decline, but will held off given high level of function still.   -Continued Effexor XR 37.5 for mood. -Continued Aricept 10mg daily and Memantine 10mg bid. He returns for delayed f/u. His wife provides all of the history. He has had decline in memory, he does not know her name. He was unable to go to [facility] for about 3.5 months, now back, but things there are a lot different there. He is being non-compliant with her instructions at home. He has started experiencing incontinence, bowel from crohns, and bladder as well. She feels some of the issue is unwillingness to get up to go to restroom. She will suggest he get up to go to the bathroom, and he will tell her no.  He is still eating well, but may refuse a meal one day that he had enjoyed eating another day. She is exhausted from caring for him 24/7 without breaks during the pandemic. She is moving him to [facility] soon. He has been diagnosed with diabetes. She has noticed a slight tremor in his hands, not interfering with activity. Previous testing:  MRI brain w/wo contrast 6/19/15 with left temporal atrophy with asymmetric lateral ventricle on the left.        Past Medical History:   Diagnosis Date    Altered glucose metabolism     Crohn's disease ([address]     Frontotemporal dementia ([address]     Hypercholesterolemia     Hypertension     Rosacea       Past Surgical History:   Procedure Laterality Date    CHEST SURGERY PROCEDURE UNLISTED      rib fracturewith hematoma    HX APPENDECTOMY      HX COLONOSCOPY  2013    10 years    HX ORTHOPAEDIC      heel injury requiring skin graft    HX TONSILLECTOMY        Social History     Socioeconomic History    Marital status:      Spouse name: Not on file    Number of children: Not on file    Years of education: Not on file    Highest education level: Not on file   Occupational History    Occupation: , 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   Social Needs    Financial resource strain: Not on file    Food insecurity     Worry: Not on file     Inability: Not on file    Transportation needs     Medical: Not on file     Non-medical: Not on file   Tobacco Use    Smoking status: Never Smoker    Smokeless tobacco: Never Used   Substance and Sexual Activity    Alcohol use: No    Drug use: No    Sexual activity: Not Currently   Lifestyle    Physical activity     Days per week: Not on file     Minutes per session: Not on file    Stress: Not on file   Relationships    Social connections     Talks on phone: Not on file     Gets together: Not on file     Attends Baptism service: Not on file     Active member of club or organization: Not on file     Attends meetings of clubs or organizations: Not on file     Relationship status: Not on file    Intimate partner violence     Fear of current or ex partner: Not on file     Emotionally abused: Not on file     Physically abused: Not on file     Forced sexual activity: Not on file   Other Topics Concern    Not on file   Social History Narrative    , retired . Family History   Problem Relation Age of Onset   [facility] Arthritis-osteo Mother     Heart Disease Father 58        acute MI    Heart Disease Paternal Grandfather 62         Objective:   Review of Systems : Per HPI, o/w neg      No Known Allergies     Meds:    Current Outpatient Medications:     balsalazide (COLAZAL) 750 mg capsule, Take 2,250 mg by mouth three (3) times daily. , Disp: , Rfl:     glimepiride (AMARYL) 1 mg tablet, TAKE 1 TABLET EVERY DAY BEFORE BREAKFAST, Disp: 90 Tab, Rfl: 0    Blood-Glucose Meter monitoring kit, Diabetes mellitus . Check readings twice a day, Disp: 1 Kit, Rfl: 0    glucose blood VI test strips (blood glucose test) strip, Test as directed in clinic. Dx: Diabetes, Disp: 100 Strip, Rfl: 1    donepeziL (ARICEPT) 10 mg tablet, Take 1 Tab by mouth nightly., Disp: 90 Tab, Rfl: 4    lisinopril (PRINIVIL, ZESTRIL) 5 mg tablet, TAKE ONE TABLET BY MOUTH ONCE DAILY, Disp: 90 Tab, Rfl: 3    memantine (NAMENDA) 10 mg tablet, Take 1 Tab by mouth two (2) times a day., Disp: 180 Tab, Rfl: 4    pravastatin (PRAVACHOL) 40 mg tablet, TAKE ONE TABLET BY MOUTH NIGHTLY, Disp: 90 Tab, Rfl: 3    venlafaxine-SR (EFFEXOR-XR) 37.5 mg capsule, TAKE 1 CAPSULE BY MOUTH ONCE DAILY, Disp: 90 Cap, Rfl: 3    augmented betamethasone dipropionate (DIPROLENE-AF) 0.05 % ointment, Apply  to affected area two (2) times a day., Disp: 15 g, Rfl: 0    doxycycline (MONODOX) 100 mg capsule, Take 1 Cap by mouth daily. Rosacea   Indications: ACNE ROSACEA, Every 3 days, Disp: 30 Cap, Rfl: 3    coenzyme q10 (CO Q-10) 10 mg cap, Take 1 Cap by mouth every three (3) days. , Disp: , Rfl:    cholecalciferol (VITAMIN D3) 1,000 unit tablet, Take 1,000 Units by mouth daily. , Disp: , Rfl:     vitamin E (AQUA GEMS) 400 unit capsule, Take 400 Units by mouth daily. , Disp: , Rfl:     cyanocobalamin (VITAMIN B-12) 1,000 mcg tablet, Take 1,000 mcg by mouth daily. Indications: takes 3 days a week, Disp: , Rfl:     TURMERIC (CURCUMIN), by Does Not Apply route., Disp: , Rfl:     VIT C/E/B6/FA/B12/ARGIN/PEP XT (CARDIOTEK, BIOPERINE, PO), Take  by mouth., Disp: , Rfl:     magnesium 250 mg tab, Take  by mouth daily. Indications: takes 3 times a week, Disp: , Rfl:     multivitamin (ONE A DAY) tablet, Take 1 Tab by mouth daily. , Disp: , Rfl:     aspirin delayed-release 81 mg tablet, Take 81 mg by mouth every three (3) days. , Disp: , Rfl:       Imaging:  MRI Results (most recent):  Results from [facility] on 04/24/18   MRI BRAIN RESEARCH WO CONT    Narrative INDICATION:  screening study for research     COMPARISON:  None    TECHNIQUE:  MR imaging of the brain was performed per Alzheimer's research study  including the following sequences: axial FLAIR, GRE, noncontrast coronal MPRAGE  T1.    FINDINGS:      The ventricles are midline without hydrocephalus. There is no acute intra or  extra-axial fluid collection. There is mild periventricular T2 signal  hyperintensity in the supratentorial brain as well as a few scattered punctate  foci of T2 hyperintensity in the subcortical white matter of both frontal and  parietal lobes. There is no evidence of previous microhemorrhage. There is no  evidence of previous macro hemorrhage. There is no evidence of superficial  siderosis. Thus, no evidence of ARIA-H  There are no areas of sulcal effusion. There is no parenchymal vasogenic edema. Thus no evidence of ARIA-E The major  intracranial vascular flow-voids are patent. 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   No evidence of ARIA-H or  ARIA-E.        CT Results (most recent):  No results found for this or any previous visit. Reviewed records in [employer] and "[employer] tab today    Lab Review   Results for orders placed or performed in visit on 02/20/20   HEMOGLOBIN A1C WITH EAG   Result Value Ref Range    Hemoglobin A1c 7.1 (H) 4.8 - 5.6 %    Estimated average glucose 157 mg/dL   LIPID PANEL   Result Value Ref Range    Cholesterol, total 133 100 - 199 mg/dL    Triglyceride 106 0 - 149 mg/dL    HDL Cholesterol 50 >39 mg/dL    VLDL, calculated 21 5 - 40 mg/dL    LDL, calculated 62 0 - 99 mg/dL        Exam:  Visit Vitals  /60   Pulse 68   Resp 18   Wt 65.3 kg (144 lb)   SpO2 98%   BMI 24.72 kg/m²     General:  Alert, cooperative, no distress. Head:  Normocephalic, without obvious abnormality, atraumatic. Respiratory:  Heart:   Non-labored breathing  RRR, no murmurs   Neck:      Extremities: Warm, no edema   Pulses: 2+ radial pulses       Neurologic:  MS: Alert, speech -no dysarthria, minimal spontaneous speech. Language- unable to name, difficulty following commands. Memory impaired.    Cranial Nerves:  II: visual fields    II: pupils    II: optic disc    III,VII: ptosis none   III,IV,VI: extraocular muscles  EOMI, no nystagmus    V: facial light touch sensation     VII: facial muscle function   symmetric   VIII: hearing intact   IX: soft palate elevation     XI: trapezius strength     XI: sternocleidomastoid strength    XII: tongue       Motor: 5/5 throughout, no PD, slight tremulousness seen  Sensory:   Coordination: Intact FTN though pt had difficulty following command  Gait: Normal gait, no tremor seen, no shuffling  Reflexes:     Mini Mental State Exam 10/24/2019 4/25/2019 10/18/2018 10/19/2017 4/13/2017 10/13/2016   What is the Year 0 0 1 0 1 1   What is the Season 0 0 0 1 1 0   What is the Date 0 0 0 1 0 0   What is the Day 0 1 1 1 1 1   What is the Month 0 0 0 1 1 1   Where are we State 1 1 1 1 1 1   Where are we Country 1 0 0 1 1 1   Where are we [place] or City 1 1 1 1 1 1   Where are we Floor 0 0 1 1 1 1   Name three objects, then ask the patient to say them 2 3 3 3 3 3   Serial sevens Subtract 7 from 100 in increments 0 0 0 4 4 5   Ask for the three objects repeated above 0 1 0 1 0 0   Name a pencil 0 0 0 0 1 1   Name a watch 0 0 0 0 0 0   Have the patient repeat this phrase \"No ifs, ands, or buts\" 0 1 0 0 1 1   Three stage command: Take the paper in your right hand 1 1 1 1 1 1   Fold the paper in half 1 1 1 1 1 1   Put the paper on the floor 1 1 1 1 1 1   Read and obey the following: CLOSE YOUR EYES 1 1 1 1 1 1   Have the patient write a sentence 0 1 1 1 0 1   Have the patient copy a figure 1 1 0 1 1 1   Mini Mental Score 10 14 13 23 23 24          Assessment/Plan   Pt is a 70y.o. left handed male with dementia, possible FTD vs AD, with negative amyloid PET scan through the [address], with progressive memory loss first noticed around 2013, becoming very noticeable in 2015 with Neuropsychological testing by Dr. Ernesto Bermeo in March, 2015 with data supporting a memory issue as well as for other cognitive issues including fluency and naming. 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 Pt has anxiety and depression, but no other significant change in personality, compulsive behaviors, disinhibition, or apathy reported by his wife. Mood improved after starting Effexor XR 37.5mg daily. Exam is non-focal, no spontaneous speech, does not answer questions, MMSE not attempted. -Recommend increasing Effexor XR to 75mg daily to see if this helps with some of the defiant behavior.   -Discontinue Aricept. -Continue Memantine 10mg bid.   -She would like him to f/u as needed at this time. ICD-10-CM ICD-9-CM    1. Dementia without behavioral disturbance, unspecified dementia type ([facility] [address]  F03.90 294.20    2. Irritability and anger  R45.4 799.22        Signed:   Annelise Rizo MD  8/4/2020

## 2020-08-05 NOTE — TELEPHONE ENCOUNTER
Spoke with  Araceli Hurst, she says she feels her  would benefit from a handicap placard- he moves very slow and it would be helpful. She would like it mailed to her if  is willing to fill out the form.

## 2020-08-06 ENCOUNTER — TELEPHONE (OUTPATIENT)
Dept: PRIMARY CARE CLINIC | Age: 70
End: 2020-08-06

## 2020-08-06 NOTE — TELEPHONE ENCOUNTER
Pt's wife stating that the pt will be admitted in a facility as he has Alzheimer's. And would like to speak with a nurse about the labs that he has to do before he comes for the Medicare Wellness on the 27th of the month.

## 2020-08-06 NOTE — TELEPHONE ENCOUNTER
Confirmed speaking with wife. States that patient has an appointment on Aug 27th. States that he is going into a facility as his alzheimer's has gotten worse. States that he will be turned over to the facility doctor once he is there. States that she has been told that he has support groups that are recommending labs like potassium, magnesium, and b12. Was not sure, but wants to get any labs that Dr Ashleigh Gomez would recommend. 1. Wants to come for labs before the visit about a week before.     2 needs to know if she will need an appointment as the labs will be done downstairs at that time

## 2020-08-06 NOTE — TELEPHONE ENCOUNTER
Confirmed speaking to wife. Advised to call back on 8/17 which is that Monday and we will get the labs put in and will know the process for making the appointment for the lab.   Wife is going to make her appointment after patient is placed in his facility

## 2020-08-06 NOTE — TELEPHONE ENCOUNTER
They will need an appointment but I can`t put any order before 08/15 as I don`t know yet how to order those labs. Tell her to contact us after 08/16 for the labs & will put it for him. Is she making her appointment as well?

## 2020-08-07 ENCOUNTER — TELEPHONE (OUTPATIENT)
Dept: NEUROLOGY | Facility: CLINIC | Age: 70
End: 2020-08-07

## 2020-08-07 NOTE — TELEPHONE ENCOUNTER
Pt's wife calling in regards to pt, stated she picked him up from his day facility and they told her he has been stating that he has to urinate all day but has not used the bathroom at all. Didn't know if this was a side effect of medication. Please call.

## 2020-08-07 NOTE — TELEPHONE ENCOUNTER
Spoke with Mrs. Norma Huggins, informed her  will fill out DMV placard form. -Form is in your folder.

## 2020-08-10 ENCOUNTER — TELEPHONE (OUTPATIENT)
Dept: PRIMARY CARE CLINIC | Age: 70
End: 2020-08-10

## 2020-08-10 NOTE — TELEPHONE ENCOUNTER
Venita Jessica - Please call pt's wife. It could be, if problem has not resolved, recommend holding Effexor XR and contacting PCP if she hasn't already.

## 2020-08-10 NOTE — TELEPHONE ENCOUNTER
The nurse informed that the pt's wife informed them that the pt has recently diagnosed with diabetics and they would need to get the paperwork for that and she also requested to talk to a nurse one on one about the sugar kit script order. Requested a call back at [phone] and dial [phone] for  and ask for a nurse.

## 2020-08-10 NOTE — TELEPHONE ENCOUNTER
Called and spoke with the patient's wife, verified patient's , patient's wife stated \"once I got him home on Friday, he went to the bathroom a whole lot and since then has not complained of it again,\" I advised that if this reoccurs, to contact PCP and per Dr. Rupa Nava may need to consider holding Effexor at that time. I also advised that I have mailed out the requested DMV form.

## 2020-08-10 NOTE — TELEPHONE ENCOUNTER
Returned to nurse Ellouise Siemens from day program patient attends. She stated that the wife had mentioned to the staff that he was diabetic and should be checking his blood sugars but she wasn't doing it. Hany Smith asked if Dr Tara Alexandra would be ok if his blood sugar was checked there before lunch. Spoke with Dr Tara Alexandra and she stated this fine.

## 2020-08-11 ENCOUNTER — TELEPHONE (OUTPATIENT)
Dept: NEUROLOGY | Facility: CLINIC | Age: 70
End: 2020-08-11

## 2020-08-11 NOTE — TELEPHONE ENCOUNTER
Remigio Pugh - Please call pt's wife: She may decrease Namenda to 5mg twice a day, 1/2 tab twice a day, for 2 weeks, then stop.

## 2020-08-11 NOTE — TELEPHONE ENCOUNTER
Pt's wife calling in regards to appt last week, pt was taken off of medication. States she is ready to take pt off namenda, does it need to be tapered or can she just stop it. Please call.

## 2020-08-20 DIAGNOSIS — Z11.1 SCREENING-PULMONARY TB: Primary | ICD-10-CM

## 2020-08-20 LAB
ALBUMIN SERPL-MCNC: 2.8 G/DL (ref 3.5–5)
ALBUMIN/GLOB SERPL: 0.7 {RATIO} (ref 1.1–2.2)
ALP SERPL-CCNC: 66 U/L (ref 45–117)
ALT SERPL-CCNC: 11 U/L (ref 12–78)
ANION GAP SERPL CALC-SCNC: 6 MMOL/L (ref 5–15)
AST SERPL-CCNC: 8 U/L (ref 15–37)
BASOPHILS # BLD: 0.1 K/UL (ref 0–0.1)
BASOPHILS NFR BLD: 1 % (ref 0–1)
BILIRUB SERPL-MCNC: 0.2 MG/DL (ref 0.2–1)
BUN SERPL-MCNC: 19 MG/DL (ref 6–20)
BUN/CREAT SERPL: 24 (ref 12–20)
CALCIUM SERPL-MCNC: 8.9 MG/DL (ref 8.5–10.1)
CHLORIDE SERPL-SCNC: 104 MMOL/L (ref 97–108)
CHOLEST SERPL-MCNC: 136 MG/DL
CO2 SERPL-SCNC: 28 MMOL/L (ref 21–32)
CREAT SERPL-MCNC: 0.8 MG/DL (ref 0.7–1.3)
DIFFERENTIAL METHOD BLD: ABNORMAL
EOSINOPHIL # BLD: 0.3 K/UL (ref 0–0.4)
EOSINOPHIL NFR BLD: 4 % (ref 0–7)
ERYTHROCYTE [DISTWIDTH] IN BLOOD BY AUTOMATED COUNT: 18.7 % (ref 11.5–14.5)
EST. AVERAGE GLUCOSE BLD GHB EST-MCNC: 148 MG/DL
GLOBULIN SER CALC-MCNC: 3.9 G/DL (ref 2–4)
GLUCOSE SERPL-MCNC: 123 MG/DL (ref 65–100)
HBA1C MFR BLD: 6.8 % (ref 4–5.6)
HCT VFR BLD AUTO: 31.6 % (ref 36.6–50.3)
HDLC SERPL-MCNC: 55 MG/DL
HDLC SERPL: 2.5 {RATIO} (ref 0–5)
HGB BLD-MCNC: 8.4 G/DL (ref 12.1–17)
IMM GRANULOCYTES # BLD AUTO: 0 K/UL (ref 0–0.04)
IMM GRANULOCYTES NFR BLD AUTO: 0 % (ref 0–0.5)
LDLC SERPL CALC-MCNC: 61.8 MG/DL (ref 0–100)
LIPID PROFILE,FLP: NORMAL
LYMPHOCYTES # BLD: 1.2 K/UL (ref 0.8–3.5)
LYMPHOCYTES NFR BLD: 16 % (ref 12–49)
MCH RBC QN AUTO: 19.3 PG (ref 26–34)
MCHC RBC AUTO-ENTMCNC: 26.6 G/DL (ref 30–36.5)
MCV RBC AUTO: 72.6 FL (ref 80–99)
MONOCYTES # BLD: 0.5 K/UL (ref 0–1)
MONOCYTES NFR BLD: 7 % (ref 5–13)
NEUTS SEG # BLD: 5.5 K/UL (ref 1.8–8)
NEUTS SEG NFR BLD: 72 % (ref 32–75)
NRBC # BLD: 0 K/UL (ref 0–0.01)
NRBC BLD-RTO: 0 PER 100 WBC
PLATELET # BLD AUTO: 557 K/UL (ref 150–400)
PMV BLD AUTO: 9.4 FL (ref 8.9–12.9)
POTASSIUM SERPL-SCNC: 4.3 MMOL/L (ref 3.5–5.1)
PROT SERPL-MCNC: 6.7 G/DL (ref 6.4–8.2)
RBC # BLD AUTO: 4.35 M/UL (ref 4.1–5.7)
RBC MORPH BLD: ABNORMAL
SODIUM SERPL-SCNC: 138 MMOL/L (ref 136–145)
TRIGL SERPL-MCNC: 96 MG/DL (ref ?–150)
VLDLC SERPL CALC-MCNC: 19.2 MG/DL
WBC # BLD AUTO: 7.6 K/UL (ref 4.1–11.1)

## 2020-08-24 ENCOUNTER — TELEPHONE (OUTPATIENT)
Dept: PRIMARY CARE CLINIC | Age: 70
End: 2020-08-24

## 2020-08-24 LAB
M TB IFN-G BLD-IMP: ABNORMAL
QUANTIFERON CRITERIA, QFI1T: ABNORMAL
QUANTIFERON MITOGEN VALUE: 0.13 IU/ML
QUANTIFERON NIL VALUE: 0.02 IU/ML
QUANTIFERON TB1 AG: 0.03 IU/ML
QUANTIFERON TB2 AG: 0.03 IU/ML

## 2020-08-24 NOTE — TELEPHONE ENCOUNTER
Spoke with Peace Blake at [facility] adult day care. Hany Smith states that they were checking patients blood sugar once a week. Requesting to check on the three days that he is with them as his sugars have been running 209 and 229 the last two weeks. Gave verbal to do the sugar checks.

## 2020-08-24 NOTE — TELEPHONE ENCOUNTER
They want to see if they are able to check his Blood Sugar at lunch time daily; they eat at 11:30 am and would like to start this today. Please call back asap.

## 2020-08-27 ENCOUNTER — HOSPITAL ENCOUNTER (OUTPATIENT)
Dept: GENERAL RADIOLOGY | Age: 70
Discharge: HOME | End: 2020-08-27
Attending: INTERNAL MEDICINE
Payer: MEDICARE

## 2020-08-27 ENCOUNTER — OFFICE VISIT (OUTPATIENT)
Dept: PRIMARY CARE CLINIC | Age: 70
End: 2020-08-27
Payer: MEDICARE

## 2020-08-27 VITALS
BODY MASS INDEX: 25.74 KG/M2 | DIASTOLIC BLOOD PRESSURE: 70 MMHG | TEMPERATURE: 97.6 F | SYSTOLIC BLOOD PRESSURE: 109 MMHG | OXYGEN SATURATION: 98 % | WEIGHT: 150.8 LBS | HEART RATE: 63 BPM | RESPIRATION RATE: 16 BRPM | HEIGHT: 64 IN

## 2020-08-27 DIAGNOSIS — Z00.00 MEDICARE ANNUAL WELLNESS VISIT, SUBSEQUENT: Primary | ICD-10-CM

## 2020-08-27 DIAGNOSIS — Z11.1 SCREENING-PULMONARY TB: ICD-10-CM

## 2020-08-27 DIAGNOSIS — L71.9 ROSACEA: ICD-10-CM

## 2020-08-27 DIAGNOSIS — K50.80 CROHN'S DISEASE OF BOTH SMALL AND LARGE INTESTINE WITHOUT COMPLICATION (HCC): ICD-10-CM

## 2020-08-27 DIAGNOSIS — D50.8 OTHER IRON DEFICIENCY ANEMIA: ICD-10-CM

## 2020-08-27 DIAGNOSIS — E11.9 CONTROLLED TYPE 2 DIABETES MELLITUS WITHOUT COMPLICATION, WITHOUT LONG-TERM CURRENT USE OF INSULIN (HCC): ICD-10-CM

## 2020-08-27 DIAGNOSIS — F03.90 DEMENTIA WITHOUT BEHAVIORAL DISTURBANCE, UNSPECIFIED DEMENTIA TYPE: ICD-10-CM

## 2020-08-27 PROCEDURE — 2022F DILAT RTA XM EVC RTNOPTHY: CPT | Performed by: INTERNAL MEDICINE

## 2020-08-27 PROCEDURE — 71046 X-RAY EXAM CHEST 2 VIEWS: CPT

## 2020-08-27 PROCEDURE — G8754 DIAS BP LESS 90: HCPCS | Performed by: INTERNAL MEDICINE

## 2020-08-27 PROCEDURE — G8432 DEP SCR NOT DOC, RNG: HCPCS | Performed by: INTERNAL MEDICINE

## 2020-08-27 PROCEDURE — 3288F FALL RISK ASSESSMENT DOCD: CPT | Performed by: INTERNAL MEDICINE

## 2020-08-27 PROCEDURE — G8427 DOCREV CUR MEDS BY ELIG CLIN: HCPCS | Performed by: INTERNAL MEDICINE

## 2020-08-27 PROCEDURE — G8752 SYS BP LESS 140: HCPCS | Performed by: INTERNAL MEDICINE

## 2020-08-27 PROCEDURE — G8419 CALC BMI OUT NRM PARAM NOF/U: HCPCS | Performed by: INTERNAL MEDICINE

## 2020-08-27 PROCEDURE — 3044F HG A1C LEVEL LT 7.0%: CPT | Performed by: INTERNAL MEDICINE

## 2020-08-27 PROCEDURE — G8536 NO DOC ELDER MAL SCRN: HCPCS | Performed by: INTERNAL MEDICINE

## 2020-08-27 PROCEDURE — 1100F PTFALLS ASSESS-DOCD GE2>/YR: CPT | Performed by: INTERNAL MEDICINE

## 2020-08-27 PROCEDURE — 99214 OFFICE O/P EST MOD 30 MIN: CPT | Performed by: INTERNAL MEDICINE

## 2020-08-27 PROCEDURE — 3017F COLORECTAL CA SCREEN DOC REV: CPT | Performed by: INTERNAL MEDICINE

## 2020-08-27 PROCEDURE — G0439 PPPS, SUBSEQ VISIT: HCPCS | Performed by: INTERNAL MEDICINE

## 2020-08-27 RX ORDER — PRAVASTATIN SODIUM 20 MG/1
20 TABLET ORAL
Qty: 90 TAB | Refills: 0 | Status: SHIPPED | OUTPATIENT
Start: 2020-08-27 | End: 2020-11-25

## 2020-08-27 RX ORDER — GLIMEPIRIDE 1 MG/1
1 TABLET ORAL
Qty: 90 TAB | Refills: 0 | Status: SHIPPED | OUTPATIENT
Start: 2020-08-27 | End: 2020-11-25

## 2020-08-27 NOTE — PROGRESS NOTES
Chief Complaint   Patient presents with    Annual Wellness Visit    Other     patient is entering into a facility  [facility]           Manuel Wayne. is a 69 y.o. male and presents for Annual Medicare Wellness Visit. Assessment of cognitive impairment: Alert and oriented to person . Patient has dementia and has to be prompted by wife     Depression Screen:   3 most recent PHQ Screens 8/27/2020   PHQ Not Done Medical Reason (indicate in comments)   Little interest or pleasure in doing things -   Feeling down, depressed, irritable, or hopeless -   Total Score PHQ 2 -       Fall Risk Assessment:    Fall Risk Assessment, last 12 mths 8/27/2020   Able to walk? Yes   Fall in past 12 months? Yes   Fall with injury? No   Number of falls in past 12 months 1   Fall Risk Score 1       Abuse Screen:   Abuse Screening Questionnaire 8/27/2020   Do you ever feel afraid of your partner? N   Are you in a relationship with someone who physically or mentally threatens you? N   Is it safe for you to go home?  Y       Activities of Daily Living:  Needs assistance  Requires assistance with: with all daily living activities  Patient handle his/her own medications  noUse of pill box  no  Activities of Daily Living:   ADL Assessment 8/27/2020   Feeding yourself Help Needed   Getting from bed to chair Help Needed   Getting dressed Help Needed   Bathing or showering Help Needed   Walk across the room (includes cane/walker) Help Needed   Using the telphone Help Needed   Taking your medications Help Needed   Preparing meals Help Needed   Managing money (expenses/bills) Help Needed   Moderately strenuous housework (laundry) Help Needed   Shopping for personal items (toiletries/medicines) Help Needed   Shopping for groceries Help Needed   Driving Help Needed   Climbing a flight of stairs Help Needed   Getting to places beyond walking distances Help Needed       Health Maintenance:  Daily Aspirin: no , low hemoglobin  Bone Density: n/a  Glaucoma Screening; due however these are difficult visits due to patients dementia  Immunizations:    Tetanus: next due 1/1/2021. Influenza: will get in the September   Shingles: completed PPSV-23: completed. Prevnar-13: completed. Cancer screening:      Colon: next due 11/27/2023. Prostate: done 8/8/2019 2.0 normal range    Alcohol Risk Screen:   On any occasion during the past 3 months, have you had more than 3 drinks(female) or 4 drinks (male) containing alcohol in one?  no  Do you average more than 7 drinks (female) or 14 drinks (male) per week?  no  Type and amount:none    Hearing Loss:  Needs hearing aid but not wearing as he takes them out and throws them away    Vision Loss:   Wears glasses, no    Adult Nutrition Screen:patient is diabetic    Advance Care Planning:   End of Life Planning: yes and DNR   Naty Hammond ACP-Facilitator appointment no      Medications/Allergies: Reviewed with patient  Prior to Admission medications    Medication Sig Start Date End Date Taking? Authorizing Provider   balsalazide (COLAZAL) 750 mg capsule Take 2,250 mg by mouth three (3) times daily. Yes Provider, Historical   venlafaxine-SR (EFFEXOR-XR) 75 mg capsule TAKE 1 CAPSULE BY MOUTH ONCE DAILY 8/4/20  Yes Irma Rivas MD   glimepiride (AMARYL) 1 mg tablet TAKE 1 TABLET EVERY DAY BEFORE BREAKFAST 7/31/20  Yes George Toney MD   Blood-Glucose Meter monitoring kit Diabetes mellitus . Check readings twice a day 6/5/20  Yes George Toney MD   glucose blood VI test strips (blood glucose test) strip Test as directed in clinic.   Dx: Diabetes 6/5/20  Yes George Toney MD   lisinopril (PRINIVIL, ZESTRIL) 5 mg tablet TAKE ONE TABLET BY MOUTH ONCE DAILY 2/24/20  Yes George Toney MD   pravastatin (PRAVACHOL) 40 mg tablet TAKE ONE TABLET BY MOUTH NIGHTLY 2/24/20  Yes George Toney MD   augmented betamethasone dipropionate (DIPROLENE-AF) 0.05 % ointment Apply  to affected area two (2) times a day. 9/10/19  Yes Nani Ramsey MD   doxycycline (MONODOX) 100 mg capsule Take 1 Cap by mouth daily. Rosacea   Indications: ACNE ROSACEA, Every 3 days 8/7/18  Yes Parmjit Oviedo MD   coenzyme q10 (CO Q-10) 10 mg cap Take 1 Cap by mouth every three (3) days. Yes Provider, Historical   cholecalciferol (VITAMIN D3) 1,000 unit tablet Take 1,000 Units by mouth daily. Yes Provider, Historical   vitamin E (AQUA GEMS) 400 unit capsule Take 400 Units by mouth daily. Yes Provider, Historical   cyanocobalamin (VITAMIN B-12) 1,000 mcg tablet Take 1,000 mcg by mouth daily. Indications: takes 3 days a week   Yes Provider, Historical   TURMERIC (CURCUMIN) by Does Not Apply route. Yes Provider, Historical   VIT C/E/B6/FA/B12/ARGIN/PEP XT (CARDIOTEK, BIOPERINE, PO) Take  by mouth. Yes Provider, Historical   magnesium 250 mg tab Take  by mouth daily. Indications: takes 3 times a week   Yes Provider, Historical   multivitamin (ONE A DAY) tablet Take 1 Tab by mouth daily. Yes Provider, Historical   aspirin delayed-release 81 mg tablet Take 81 mg by mouth every three (3) days. Yes Provider, Historical       No Known Allergies    Objective:  Visit Vitals  /70 (BP 1 Location: Left arm, BP Patient Position: Sitting)   Pulse 63   Temp 97.6 °F (36.4 °C) (Temporal)   Resp 16   Ht 5' 4\" (1.626 m)   Wt 150 lb 12.8 oz (68.4 kg)   SpO2 98%   BMI 25.88 kg/m²    Body mass index is 25.88 kg/m². Problem List: Reviewed with patient and discussed risk factors.     Patient Active Problem List   Diagnosis Code    Benign essential hypertension I10    Hypercholesterolemia E78.00    Dementia without behavioral disturbance ([facility] [address] F03.90       PSH: Reviewed with patient  Past Surgical History:   Procedure Laterality Date    CHEST SURGERY PROCEDURE UNLISTED      rib fracturewith hematoma    HX APPENDECTOMY      HX COLONOSCOPY  2013    10 years    HX ORTHOPAEDIC      heel injury requiring skin graft    HX TONSILLECTOMY SH: Reviewed with patient  Social History     Tobacco Use    Smoking status: Never Smoker    Smokeless tobacco: Never Used   Substance Use Topics    Alcohol use: No    Drug use: No       FH: Reviewed with patient  Family History   Problem Relation Age of Onset   [facility] Arthritis-osteo Mother     Heart Disease Father 58        acute MI    Heart Disease Paternal Grandfather 62       Current medical providers:    Patient Care Team:  Anju Rosen MD as PCP - General (Internal Medicine)  Anju Rosen MD as PCP - [facility] Empaneled Provider  Robyn Shaffer MD as Consulting Provider (Gastroenterology)    Plan:    Diagnoses and all orders for this visit:    Medicare annual wellness visit, subsequent  Immunization and Health screening discussed with his wife. Advanced directive scanned in the chart. Orders Placed This Encounter    AMB POC URINE, MICROALBUMIN, SEMIQUANT (3 RESULTS)       Health Maintenance   Topic Date Due    Foot Exam Q1  01/11/1960    MICROALBUMIN Q1  01/11/1959    Eye Exam Retinal or Dilated  01/11/1960    GLAUCOMA SCREENING Q2Y  10/15/2016    DTaP/Tdap/Td series (2 - Td) 01/01/2021    A1C test (Diabetic or Prediabetic)  02/21/2021    Lipid Screen  02/21/2021    Medicare Yearly Exam  08/28/2021    Colonoscopy  11/27/2023    Hepatitis C Screening  Completed    Shingrix Vaccine Age 50>  Completed    Pneumococcal 65+ years  Completed          Urinary/ Fecal Incontinence: yes on diapers    Regular physical exercise: does activities in the day care    Patient verbalized understanding of information presented. AVS and Medicare Part B Preventive Services Table printed and given to pt and reviewed. See table for findings under Recommendation and Scheduled. All of the patient's questions were answered. coleen by Ambrose Johns, as dictated by Dr. Luis Miguel Fernandez MD.    Anali Rivas is a 69 y.o. male.   HPI  Pt presents today for routine care and to complete paperwork for moving into a facility. He has dementia & wife is helping with history . He is still seeing Dr. Demetrius Jc, and she has taken him off of Aricept . He is still taking Effexor. He has been sleeping well and feeding himself, and he mostly knows when he has to go to the bathroom but wears diapers to be safe. He is taking Amaryl qAM and his BS is mostly stable, although he had a few times of being high or low. His labs from 8/20/20 showed a HbA1c of 6.8, a decrease from 7.1 on 2/22/20. He is no longer taking lisinopril but does take pravastatin qPM. He takes doxycycline every 3 days for his rosacea, and it has been helping. He needs all of his medications refilled through the [facility] and it has to be in blister packs. His labs showed depressed HGB (8.4), HCT (31.6), 72.6 (MCV), MCH (19.3) and MCHC (26.6) and elevated RDW (18.7) and platelets (255). He has recently been diagnosed with Crohn's Disease. He takes 9 tablets balsalazide every day, 3 tablets with each meal. He has soft stools but no diarrhea recently. He has Imodium PRN. His Quantiferon TB test returned indeterminate. He has not had an eye exam done recently because of his dementia. Patient Active Problem List   Diagnosis Code    Benign essential hypertension I10    Hypercholesterolemia E78.00    Dementia without behavioral disturbance ([facility] [address] F03.90        Current Outpatient Medications on File Prior to Visit   Medication Sig Dispense Refill    balsalazide (COLAZAL) 750 mg capsule Take 2,250 mg by mouth three (3) times daily.  venlafaxine-SR (EFFEXOR-XR) 75 mg capsule TAKE 1 CAPSULE BY MOUTH ONCE DAILY 90 Cap 1    Blood-Glucose Meter monitoring kit Diabetes mellitus . Check readings twice a day 1 Kit 0    glucose blood VI test strips (blood glucose test) strip Test as directed in clinic.   Dx: Diabetes 100 Strip 1    augmented betamethasone dipropionate (DIPROLENE-AF) 0.05 % ointment Apply  to affected area two (2) times a day. 15 g 0    doxycycline (MONODOX) 100 mg capsule Take 1 Cap by mouth daily. Rosacea   Indications: ACNE ROSACEA, Every 3 days 30 Cap 3    coenzyme q10 (CO Q-10) 10 mg cap Take 1 Cap by mouth every three (3) days.  cholecalciferol (VITAMIN D3) 1,000 unit tablet Take 1,000 Units by mouth daily.  vitamin E (AQUA GEMS) 400 unit capsule Take 400 Units by mouth daily.  cyanocobalamin (VITAMIN B-12) 1,000 mcg tablet Take 1,000 mcg by mouth daily. Indications: takes 3 days a week      VIT C/E/B6/FA/B12/ARGIN/PEP XT (CARDIOTEK, BIOPERINE, PO) Take  by mouth.  magnesium 250 mg tab Take  by mouth daily. Indications: takes 3 times a week      multivitamin (ONE A DAY) tablet Take 1 Tab by mouth daily.  aspirin delayed-release 81 mg tablet Take 81 mg by mouth every three (3) days.  [DISCONTINUED] glimepiride (AMARYL) 1 mg tablet TAKE 1 TABLET EVERY DAY BEFORE BREAKFAST 90 Tab 0    [DISCONTINUED] lisinopril (PRINIVIL, ZESTRIL) 5 mg tablet TAKE ONE TABLET BY MOUTH ONCE DAILY 90 Tab 3    [DISCONTINUED] pravastatin (PRAVACHOL) 40 mg tablet TAKE ONE TABLET BY MOUTH NIGHTLY 90 Tab 3    [DISCONTINUED] TURMERIC (CURCUMIN) by Does Not Apply route. No current facility-administered medications on file prior to visit.         No Known Allergies    Past Medical History:   Diagnosis Date    Altered glucose metabolism     Crohn's disease ([address]     [facility]otemporal dementia ([facility] [address]     Hypercholesterolemia     Hypertension     Rosacea        Past Surgical History:   Procedure Laterality Date    CHEST SURGERY PROCEDURE UNLISTED      rib fracturewith hematoma    HX APPENDECTOMY      HX COLONOSCOPY  2013    10 years    HX ORTHOPAEDIC      heel injury requiring skin graft    HX TONSILLECTOMY         Family History   Problem Relation Age of Onset    Arthritis-osteo Mother     Heart Disease Father 58        acute MI    Heart Disease Paternal Grandfather 62 Social History     Socioeconomic History    Marital status:      Spouse name: Not on file    Number of children: Not on file    Years of education: Not on file    Highest education level: Not on file   Occupational History    Occupation: , 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   Social Needs    Financial resource strain: Not on file    Food insecurity     Worry: Not on file     Inability: Not on file   [employer] needs     Medical: Not on file     Non-medical: Not on file   Tobacco Use    Smoking status: Never Smoker    Smokeless tobacco: Never Used   Substance and Sexual Activity    Alcohol use: No    Drug use: No    Sexual activity: Not Currently   Lifestyle    Physical activity     Days per week: Not on file     Minutes per session: Not on file    Stress: Not on file   Relationships    Social connections     Talks on phone: Not on file     Gets together: Not on file     Attends Sikhism service: Not on file     Active member of club or organization: Not on file     Attends meetings of clubs or organizations: Not on file     Relationship status: Not on file    Intimate partner violence     Fear of current or ex partner: Not on file     Emotionally abused: Not on file     Physically abused: Not on file     Forced sexual activity: Not on file   Other Topics Concern    Not on file   Social History Narrative    , retired .          Orders Only on 08/20/2020   Component Date Value Ref Range Status    Hemoglobin A1c 08/20/2020 6.8* 4.0 - 5.6 % Final    Comment: NEW METHOD PLEASE NOTE NEW REFERENCE RANGE  (NOTE)  HbA1C Interpretive Ranges  <5.7              Normal  5.7 - 6.4         Consider Prediabetes  >6.5              Consider Diabetes      Est. average glucose 08/20/2020 148  mg/dL Final   Orders Only on 08/20/2020   Component Date Value Ref Range Status    LIPID PROFILE 08/20/2020        Final    Cholesterol, total 08/20/2020 136  <200 MG/DL Final    Triglyceride 08/20/2020 96  <150 MG/DL Final    Comment: Based on NCEP-ATP III:  Triglycerides <150 mg/dL  is considered normal, 150-199  mg/dL  borderline high,  200-499 mg/dL high and  greater than or equal to 500  mg/dL very high.  HDL Cholesterol 08/20/2020 55  MG/DL Final    Comment: Based on NCEP ATP III, HDL Cholesterol <40 mg/dL is considered low and >60  mg/dL is elevated.  LDL, calculated 08/20/2020 61.8  0 - 100 MG/DL Final    Comment: Based on the NCEP-ATP: LDL-C concentrations are considered  optimal <100 mg/dL,  near optimal/above Normal 100-129 mg/dL Borderline High: 130-159, High: 160-189  mg/dL Very High: Greater than or equal to 190 mg/dL      VLDL, calculated 08/20/2020 19.2  MG/DL Final    CHOL/HDL Ratio 08/20/2020 2.5  0.0 - 5.0   Final   Orders Only on 08/20/2020   Component Date Value Ref Range Status    QuantiFERON Criteria 08/20/2020 Comment    Final    Comment: (NOTE)  The QuantiFERON-TB Gold Plus result is determined by subtracting  the Nil value from either TB antigen (Ag) tube. The mitogen tube  serves as a control for the test.      QuantiFERON TB1 Ag 08/20/2020 0.03  IU/mL Final    QuantiFERON TB2 Ag 08/20/2020 0.03  IU/mL Final    QuantiFERON Nil Value 08/20/2020 0.02  IU/mL Final    QuantiFERON Mitogen Value 08/20/2020 0.13  IU/mL Final    QuantiFERON Plus 08/20/2020 Indeterminate* Negative   Final    Comment: (NOTE)  Mitogen (positive control) gave low response. This may occur due to  suboptimal pre-analytical handling. The specimen received for QuantiFERON testing was incubated by the  ordering institution. Specific procedures outlined in our  Directory of Services and in the package insert for the QuantiFERON  Gold (In Tube) test must be followed to enable for proper stimulation  of cells for the production of interferon gamma.   Performed At: [address] [id number]  Josselin Hicks MD [id number]     Orders Only on 08/20/2020   Component Date Value Ref Range Status    Sodium 08/20/2020 138  136 - 145 mmol/L Final    Potassium 08/20/2020 4.3  3.5 - 5.1 mmol/L Final    Chloride 08/20/2020 104  97 - 108 mmol/L Final    CO2 08/20/2020 28  21 - 32 mmol/L Final    Anion gap 08/20/2020 6  5 - 15 mmol/L Final    Glucose 08/20/2020 123* 65 - 100 mg/dL Final    BUN 08/20/2020 19  6 - 20 MG/DL Final    Creatinine 08/20/2020 0.80  0.70 - 1.30 MG/DL Final    BUN/Creatinine ratio 08/20/2020 24* 12 - 20   Final    GFR est AA 08/20/2020 >60  >60 ml/min/1.73m2 Final    GFR est non-AA 08/20/2020 >60  >60 ml/min/1.73m2 Final    Comment: Estimated GFR is calculated using the IDMS-traceable Modification of Diet in  Renal Disease (MDRD) Study equation, reported for both  Americans  (GFRAA) and non- Americans (GFRNA), and normalized to 1.73m2 body  surface area. The physician must decide which value applies to the patient. The MDRD study equation should only be used in individuals age 25 or older. It  has not been validated for the following: pregnant women, patients with serious  comorbid conditions, or on certain medications, or persons with extremes of  body size, muscle mass, or nutritional status.  Calcium 08/20/2020 8.9  8.5 - 10.1 MG/DL Final    Bilirubin, total 08/20/2020 0.2  0.2 - 1.0 MG/DL Final    ALT (SGPT) 08/20/2020 11* 12 - 78 U/L Final    AST (SGOT) 08/20/2020 8* 15 - 37 U/L Final    Alk.  phosphatase 08/20/2020 66  45 - 117 U/L Final    Protein, total 08/20/2020 6.7  6.4 - 8.2 g/dL Final    Albumin 08/20/2020 2.8* 3.5 - 5.0 g/dL Final    Globulin 08/20/2020 3.9  2.0 - 4.0 g/dL Final    A-G Ratio 08/20/2020 0.7* 1.1 - 2.2   Final   Orders Only on 08/20/2020   Component Date Value Ref Range Status    WBC 08/20/2020 7.6  4.1 - 11.1 K/uL Final    RBC 08/20/2020 4.35  4.10 - 5.70 M/uL Final    HGB 08/20/2020 8.4* 12.1 - 17.0 g/dL Final    HCT 08/20/2020 31.6* 36.6 - 50.3 % Final    MCV 08/20/2020 72.6* 80.0 - 99.0 FL Final    MCH 08/20/2020 19.3* 26.0 - 34.0 PG Final    MCHC 08/20/2020 26.6* 30.0 - 36.5 g/dL Final    RDW 08/20/2020 18.7* 11.5 - 14.5 % Final    PLATELET 25/71/9620 706* 150 - 400 K/uL Final    MPV 08/20/2020 9.4  8.9 - 12.9 FL Final    NRBC 08/20/2020 0.0  0  WBC Final    ABSOLUTE NRBC 08/20/2020 0.00  0.00 - 0.01 K/uL Final    NEUTROPHILS 08/20/2020 72  32 - 75 % Final    LYMPHOCYTES 08/20/2020 16  12 - 49 % Final    MONOCYTES 08/20/2020 7  5 - 13 % Final    EOSINOPHILS 08/20/2020 4  0 - 7 % Final    BASOPHILS 08/20/2020 1  0 - 1 % Final    IMMATURE GRANULOCYTES 08/20/2020 0  0.0 - 0.5 % Final    ABS. NEUTROPHILS 08/20/2020 5.5  1.8 - 8.0 K/UL Final    ABS. LYMPHOCYTES 08/20/2020 1.2  0.8 - 3.5 K/UL Final    ABS. MONOCYTES 08/20/2020 0.5  0.0 - 1.0 K/UL Final    ABS. EOSINOPHILS 08/20/2020 0.3  0.0 - 0.4 K/UL Final    ABS. BASOPHILS 08/20/2020 0.1  0.0 - 0.1 K/UL Final    ABS. IMM. GRANS. 08/20/2020 0.0  0.00 - 0.04 K/UL Final    DF 08/20/2020 SMEAR SCANNED    Final    RBC COMMENTS 08/20/2020     Final                    Value:ANISOCYTOSIS  1+      RBC COMMENTS 08/20/2020     Final                    Value:HYPOCHROMIA  3+      RBC COMMENTS 08/20/2020     Final                    Value:MICROCYTOSIS  1+       Review of Systems   Unable to perform ROS: Dementia   . Visit Vitals  /70 (BP 1 Location: Left arm, BP Patient Position: Sitting)   Pulse 63   Temp 97.6 °F (36.4 °C) (Temporal)   Resp 16   Ht 5' 4\" (1.626 m)   Wt 150 lb 12.8 oz (68.4 kg)   SpO2 98%   BMI 25.88 kg/m²     Physical Exam  Vitals signs and nursing note reviewed. Constitutional:       General: He is not in acute distress. Appearance: He is well-developed and well-groomed. He is not diaphoretic. HENT:      Right Ear: External ear normal.      Left Ear: External ear normal.   Eyes:      General: No scleral icterus. Right eye: No discharge.          Left eye: No discharge. Extraocular Movements: Extraocular movements intact. Conjunctiva/sclera: Conjunctivae normal.   Neck:      Musculoskeletal: Normal range of motion and neck supple. Cardiovascular:      Rate and Rhythm: Normal rate and regular rhythm. Pulmonary:      Effort: Pulmonary effort is normal.      Breath sounds: Normal breath sounds. No wheezing. Abdominal:      General: Bowel sounds are normal.      Palpations: Abdomen is soft. Tenderness: There is no abdominal tenderness. Feet:      Comments: Diabetic foot exam:   Left: Vibratory sensation normal    Sharp/dull discrimination normal    Filament test normal sensation with micro filament   Pulse DP: 2+ (normal)   Deformities: None  Right: Vibratory sensation normal   Sharp/dull discrimination normal   Filament test normal sensation with micro filament   Pulse DP: 2+ (normal)   Deformities: None  Lymphadenopathy:      Cervical: No cervical adenopathy. Neurological:      Mental Status: He is alert and oriented to person, place, and time. Psychiatric:         Mood and Affect: Mood normal. Affect is flat. Behavior: Behavior normal.       ASSESSMENT and PLAN    ICD-10-CM ICD-9-CM    1. Controlled type 2 diabetes mellitus without complication, without long-term current use of insulin ([facility])  E11.9 250.00 AMB POC URINE, MICROALBUMIN, SEMIQUANT (3 RESULTS)    Urine sample obtained in office. pravastatin (PRAVACHOL) 20 mg tablet sent to pharmacy. I refilled his pravastatin.         glimepiride (AMARYL) 1 mg tablet sent to pharmacy. I refilled his Amaryl. 2. Dementia without behavioral disturbance, unspecified dementia type ([facility]  F03.90 294.20 Pt will be going to live in a facility soon. We completed paperwork in office today for this. 3. Other iron deficiency anemia  D50.8 280.8 Pt will not have a colonoscopy done at this time because his dementia is progressing and he will not do well on anesthesia.  Will monitor. 4. Crohn's disease of both small and large intestine without complication ([facility] [address]  Q42.78 555. 2 Pt continues on balsalazide and follows wit gastroenterology. Wife will inform GI about TB Gold test.   5. Screening-pulmonary TB  Z11.1 V74.1 XR CHEST PA LAT    I ordered a chest XR to show that he does not have TB for the facility he is moving into. Gold test was indeterminate. I instructed his wife to inform his gastroenterologist about his TB results. 6. Rosacea  L71.9 695.3 Stable, continues on doxycycline every 3 days. This plan was reviewed with the patient and patient agrees. All questions were answered. This scribe documentation was reviewed by me and accurately reflects the examination and decisions made by me. This note will not be viewable in [address]

## 2020-08-28 ENCOUNTER — TELEPHONE (OUTPATIENT)
Dept: PRIMARY CARE CLINIC | Age: 70
End: 2020-08-28

## 2020-08-28 NOTE — TELEPHONE ENCOUNTER
Pall paper work faxed to facility on 8/27/20. With the exception of serious cognitive impairment form. Advised admission there that it will sent on tomorrow. Dr Francisco Jarvis completed form. It has been faxed to [facility].

## 2020-11-19 ENCOUNTER — PATIENT MESSAGE (OUTPATIENT)
Dept: PRIMARY CARE CLINIC | Age: 70
End: 2020-11-19

## 2021-08-26 ENCOUNTER — TELEPHONE (OUTPATIENT)
Dept: PRIMARY CARE CLINIC | Age: 71
End: 2021-08-26

## 2022-03-19 PROBLEM — L71.9 ROSACEA: Status: ACTIVE | Noted: 2020-08-27

## 2022-03-20 PROBLEM — F03.90 DEMENTIA WITHOUT BEHAVIORAL DISTURBANCE (HCC): Status: ACTIVE | Noted: 2017-01-05

## 2022-03-20 PROBLEM — K50.80 CROHN'S DISEASE OF BOTH SMALL AND LARGE INTESTINE WITHOUT COMPLICATION (HCC): Status: ACTIVE | Noted: 2020-08-27

## 2023-05-11 RX ORDER — DOXYCYCLINE 100 MG/1
CAPSULE ORAL DAILY
COMMUNITY
Start: 2018-08-07

## 2023-05-11 RX ORDER — VENLAFAXINE HYDROCHLORIDE 75 MG/1
1 CAPSULE, EXTENDED RELEASE ORAL DAILY
COMMUNITY
Start: 2020-08-04

## 2023-05-11 RX ORDER — BALSALAZIDE DISODIUM 750 MG/1
CAPSULE ORAL 3 TIMES DAILY
COMMUNITY

## 2023-05-11 RX ORDER — BETAMETHASONE DIPROPIONATE 0.5 MG/G
OINTMENT TOPICAL 2 TIMES DAILY
COMMUNITY
Start: 2019-09-10

## 2023-05-11 RX ORDER — ASPIRIN 81 MG/1
TABLET ORAL
COMMUNITY